# Patient Record
Sex: FEMALE | HISPANIC OR LATINO | Employment: FULL TIME | URBAN - METROPOLITAN AREA
[De-identification: names, ages, dates, MRNs, and addresses within clinical notes are randomized per-mention and may not be internally consistent; named-entity substitution may affect disease eponyms.]

---

## 2023-02-06 ENCOUNTER — HOSPITAL ENCOUNTER (EMERGENCY)
Facility: MEDICAL CENTER | Age: 58
End: 2023-02-06
Attending: EMERGENCY MEDICINE
Payer: COMMERCIAL

## 2023-02-06 ENCOUNTER — APPOINTMENT (OUTPATIENT)
Dept: RADIOLOGY | Facility: MEDICAL CENTER | Age: 58
End: 2023-02-06
Attending: EMERGENCY MEDICINE
Payer: COMMERCIAL

## 2023-02-06 VITALS
TEMPERATURE: 97.9 F | SYSTOLIC BLOOD PRESSURE: 179 MMHG | WEIGHT: 138.89 LBS | OXYGEN SATURATION: 100 % | HEIGHT: 63 IN | HEART RATE: 86 BPM | DIASTOLIC BLOOD PRESSURE: 78 MMHG | BODY MASS INDEX: 24.61 KG/M2 | RESPIRATION RATE: 16 BRPM

## 2023-02-06 DIAGNOSIS — R10.9 FLANK PAIN: ICD-10-CM

## 2023-02-06 DIAGNOSIS — R73.9 HYPERGLYCEMIA: ICD-10-CM

## 2023-02-06 LAB
ALBUMIN SERPL BCP-MCNC: 4.5 G/DL (ref 3.2–4.9)
ALBUMIN/GLOB SERPL: 1.4 G/DL
ALP SERPL-CCNC: 98 U/L (ref 30–99)
ALT SERPL-CCNC: 28 U/L (ref 2–50)
ANION GAP SERPL CALC-SCNC: 10 MMOL/L (ref 7–16)
APPEARANCE UR: CLEAR
AST SERPL-CCNC: 22 U/L (ref 12–45)
BACTERIA #/AREA URNS HPF: ABNORMAL /HPF
BASOPHILS # BLD AUTO: 1.1 % (ref 0–1.8)
BASOPHILS # BLD: 0.06 K/UL (ref 0–0.12)
BILIRUB SERPL-MCNC: 0.4 MG/DL (ref 0.1–1.5)
BILIRUB UR QL STRIP.AUTO: NEGATIVE
BUN SERPL-MCNC: 8 MG/DL (ref 8–22)
CALCIUM ALBUM COR SERPL-MCNC: 9.2 MG/DL (ref 8.5–10.5)
CALCIUM SERPL-MCNC: 9.6 MG/DL (ref 8.4–10.2)
CHLORIDE SERPL-SCNC: 104 MMOL/L (ref 96–112)
CO2 SERPL-SCNC: 24 MMOL/L (ref 20–33)
COLOR UR: YELLOW
CREAT SERPL-MCNC: 0.53 MG/DL (ref 0.5–1.4)
EOSINOPHIL # BLD AUTO: 0.06 K/UL (ref 0–0.51)
EOSINOPHIL NFR BLD: 1.1 % (ref 0–6.9)
EPI CELLS #/AREA URNS HPF: ABNORMAL /HPF
ERYTHROCYTE [DISTWIDTH] IN BLOOD BY AUTOMATED COUNT: 37.9 FL (ref 35.9–50)
GFR SERPLBLD CREATININE-BSD FMLA CKD-EPI: 106 ML/MIN/1.73 M 2
GLOBULIN SER CALC-MCNC: 3.3 G/DL (ref 1.9–3.5)
GLUCOSE SERPL-MCNC: 172 MG/DL (ref 65–99)
GLUCOSE UR STRIP.AUTO-MCNC: >=1000 MG/DL
HCT VFR BLD AUTO: 46.1 % (ref 37–47)
HGB BLD-MCNC: 15.7 G/DL (ref 12–16)
IMM GRANULOCYTES # BLD AUTO: 0.01 K/UL (ref 0–0.11)
IMM GRANULOCYTES NFR BLD AUTO: 0.2 % (ref 0–0.9)
KETONES UR STRIP.AUTO-MCNC: NEGATIVE MG/DL
LEUKOCYTE ESTERASE UR QL STRIP.AUTO: ABNORMAL
LIPASE SERPL-CCNC: 37 U/L (ref 7–58)
LYMPHOCYTES # BLD AUTO: 1.8 K/UL (ref 1–4.8)
LYMPHOCYTES NFR BLD: 31.8 % (ref 22–41)
MCH RBC QN AUTO: 29.8 PG (ref 27–33)
MCHC RBC AUTO-ENTMCNC: 34.1 G/DL (ref 33.6–35)
MCV RBC AUTO: 87.6 FL (ref 81.4–97.8)
MICRO URNS: ABNORMAL
MONOCYTES # BLD AUTO: 0.31 K/UL (ref 0–0.85)
MONOCYTES NFR BLD AUTO: 5.5 % (ref 0–13.4)
NEUTROPHILS # BLD AUTO: 3.42 K/UL (ref 2–7.15)
NEUTROPHILS NFR BLD: 60.3 % (ref 44–72)
NITRITE UR QL STRIP.AUTO: NEGATIVE
NRBC # BLD AUTO: 0 K/UL
NRBC BLD-RTO: 0 /100 WBC
PH UR STRIP.AUTO: 6 [PH] (ref 5–8)
PLATELET # BLD AUTO: 288 K/UL (ref 164–446)
PMV BLD AUTO: 8.9 FL (ref 9–12.9)
POTASSIUM SERPL-SCNC: 4 MMOL/L (ref 3.6–5.5)
PROT SERPL-MCNC: 7.8 G/DL (ref 6–8.2)
PROT UR QL STRIP: NEGATIVE MG/DL
RBC # BLD AUTO: 5.26 M/UL (ref 4.2–5.4)
RBC # URNS HPF: ABNORMAL /HPF
RBC UR QL AUTO: NEGATIVE
SODIUM SERPL-SCNC: 138 MMOL/L (ref 135–145)
SP GR UR STRIP.AUTO: <=1.005
WBC # BLD AUTO: 5.7 K/UL (ref 4.8–10.8)
WBC #/AREA URNS HPF: ABNORMAL /HPF

## 2023-02-06 PROCEDURE — 36415 COLL VENOUS BLD VENIPUNCTURE: CPT

## 2023-02-06 PROCEDURE — 87186 SC STD MICRODIL/AGAR DIL: CPT

## 2023-02-06 PROCEDURE — 87086 URINE CULTURE/COLONY COUNT: CPT

## 2023-02-06 PROCEDURE — 74176 CT ABD & PELVIS W/O CONTRAST: CPT

## 2023-02-06 PROCEDURE — 99284 EMERGENCY DEPT VISIT MOD MDM: CPT

## 2023-02-06 PROCEDURE — 85025 COMPLETE CBC W/AUTO DIFF WBC: CPT

## 2023-02-06 PROCEDURE — 99283 EMERGENCY DEPT VISIT LOW MDM: CPT

## 2023-02-06 PROCEDURE — 87077 CULTURE AEROBIC IDENTIFY: CPT

## 2023-02-06 PROCEDURE — 83690 ASSAY OF LIPASE: CPT

## 2023-02-06 PROCEDURE — 81001 URINALYSIS AUTO W/SCOPE: CPT

## 2023-02-06 PROCEDURE — 80053 COMPREHEN METABOLIC PANEL: CPT

## 2023-02-06 RX ORDER — GLYBURIDE 2.5 MG/1
2.5 TABLET ORAL
Qty: 30 TABLET | Refills: 1 | Status: SHIPPED | OUTPATIENT
Start: 2023-02-06 | End: 2023-02-17

## 2023-02-06 RX ORDER — GLYBURIDE 5 MG/1
5 TABLET ORAL ONCE
Status: DISCONTINUED | OUTPATIENT
Start: 2023-02-06 | End: 2023-02-06 | Stop reason: HOSPADM

## 2023-02-06 RX ORDER — NITROFURANTOIN 25; 75 MG/1; MG/1
100 CAPSULE ORAL ONCE
Status: DISCONTINUED | OUTPATIENT
Start: 2023-02-06 | End: 2023-02-06 | Stop reason: HOSPADM

## 2023-02-06 RX ORDER — NITROFURANTOIN 25; 75 MG/1; MG/1
100 CAPSULE ORAL 2 TIMES DAILY
Qty: 10 CAPSULE | Refills: 0 | Status: SHIPPED | OUTPATIENT
Start: 2023-02-06 | End: 2023-02-11

## 2023-02-06 ASSESSMENT — LIFESTYLE VARIABLES
TOTAL SCORE: 0
TOTAL SCORE: 0
DO YOU DRINK ALCOHOL: NO
HAVE YOU EVER FELT YOU SHOULD CUT DOWN ON YOUR DRINKING: NO
HAVE PEOPLE ANNOYED YOU BY CRITICIZING YOUR DRINKING: NO
TOTAL SCORE: 0
EVER FELT BAD OR GUILTY ABOUT YOUR DRINKING: NO
CONSUMPTION TOTAL: INCOMPLETE
EVER HAD A DRINK FIRST THING IN THE MORNING TO STEADY YOUR NERVES TO GET RID OF A HANGOVER: NO

## 2023-02-06 NOTE — ED NOTES
Med rec updated and complete, per pt   Allergies reviewed, per pt  Pt reports no prescription medications.  Pt reports no antibiotics in the last 30 days.

## 2023-02-06 NOTE — ED TRIAGE NOTES
"Chief Complaint   Patient presents with    LLQ Pain     Started \"couple of weeks ago\", denies N/V    Flank Pain     Left, c/o urinary hesitancy, denies dysuria     BP (!) 194/110   Pulse (!) 112   Temp 36.7 °C (98.1 °F) (Temporal)   Resp 16   Ht 1.6 m (5' 3\")   Wt 63 kg (138 lb 14.2 oz)   SpO2 100%   BMI 24.60 kg/m²     "

## 2023-02-06 NOTE — ED PROVIDER NOTES
"ED Provider Note    CHIEF COMPLAINT  Chief Complaint   Patient presents with    LLQ Pain     Started \"couple of weeks ago\", denies N/V    Flank Pain     Left, c/o urinary hesitancy, denies dysuria       LIMITATION TO HISTORY   Select: None    HUE Lim is a 58 y.o. female who here with left lower quadrant abdominal pain.  Intermittent sharp stabbing since Christmas.  Happened today while she was at rest lying down.  Nonradiating.  So some urinary issues such as hesitancy possibly decreased urine flow and pushing but that is necessarily with every time.  There is no C fever chills no associated hematuria.  No associated diarrhea or constipation    Patient is here because she happened to have about rest today while she was lying in bed.  She states that it now getting better.    OUTSIDE HISTORIAN(S):  Select: See above    EXTERNAL RECORDS REVIEWED  Select: Other pending see above    REVIEW OF SYSTEMS      PAST MEDICAL HISTORY  History reviewed. No pertinent past medical history.    FAMILY HISTORY  History reviewed. No pertinent family history.    SOCIAL HISTORY  Social History     Tobacco Use    Smoking status: Never    Smokeless tobacco: Never   Substance Use Topics    Alcohol use: Yes    Drug use: Not Currently     Social History     Substance and Sexual Activity   Drug Use Not Currently       SURGICAL HISTORY  History reviewed. No pertinent surgical history.    CURRENT MEDICATIONS  No current facility-administered medications for this encounter.    Current Outpatient Medications:     multivitamin Tab, Take 1 Tablet by mouth every day., Disp: , Rfl:     Cholecalciferol (D3 PO), Take 1 Capsule by mouth every day., Disp: , Rfl:     Omega-3 Fatty Acids (OMEGA 3 PO), Take 1 Capsule by mouth every day., Disp: , Rfl:     Capsaicin-Menthol (SALONPAS GEL-PATCH HOT EX), Apply 1 Patch topically 2 times a day as needed (Apply's on left side of her stomach)., Disp: , Rfl:     ALLERGIES  Allergies   Allergen " "Reactions    Penicillins Hives       PHYSICAL EXAM  VITAL SIGNS: BP (!) 182/88   Pulse 98   Temp 36.7 °C (98.1 °F) (Temporal)   Resp 16   Ht 1.6 m (5' 3\")   Wt 63 kg (138 lb 14.2 oz)   SpO2 100%   BMI 24.60 kg/m²   Reviewed and   Constitutional: Well developed, Well nourished,.  HENT: Normocephalic, atraumatic, bilateral external ears normal, No intraoral erythema, edema, exudate  Eyes: PERRLA, conjunctiva pink, no scleral icterus.   Cardiovascular: Regular rate and rhythm. No murmurs, rubs or gallops.  No dependent edema or calf tenderness  Respiratory: Lungs clear to auscultation bilaterally. No wheezes, rales, or rhonchi.  Abdominal:  Abdomen soft, non-tender, non distended. No rebound, or guarding.    Skin: No erythema, no rash. No wounds or bruising.  Genitourinary: No costovertebral angle tenderness.   Musculoskeletal: no deformities.   Neurologic: Alert & oriented x 3, cranial nerves 2-12 intact by passive exam.  Moves 4 limbs with symmetric strength.  Psychiatric: Affect normal, Judgment normal, Mood normal.     LABS Ordered and Reviewed by Me:  Results for orders placed or performed during the hospital encounter of 02/06/23   CBC WITH DIFFERENTIAL   Result Value Ref Range    WBC 5.7 4.8 - 10.8 K/uL    RBC 5.26 4.20 - 5.40 M/uL    Hemoglobin 15.7 12.0 - 16.0 g/dL    Hematocrit 46.1 37.0 - 47.0 %    MCV 87.6 81.4 - 97.8 fL    MCH 29.8 27.0 - 33.0 pg    MCHC 34.1 33.6 - 35.0 g/dL    RDW 37.9 35.9 - 50.0 fL    Platelet Count 288 164 - 446 K/uL    MPV 8.9 (L) 9.0 - 12.9 fL    Neutrophils-Polys 60.30 44.00 - 72.00 %    Lymphocytes 31.80 22.00 - 41.00 %    Monocytes 5.50 0.00 - 13.40 %    Eosinophils 1.10 0.00 - 6.90 %    Basophils 1.10 0.00 - 1.80 %    Immature Granulocytes 0.20 0.00 - 0.90 %    Nucleated RBC 0.00 /100 WBC    Neutrophils (Absolute) 3.42 2.00 - 7.15 K/uL    Lymphs (Absolute) 1.80 1.00 - 4.80 K/uL    Monos (Absolute) 0.31 0.00 - 0.85 K/uL    Eos (Absolute) 0.06 0.00 - 0.51 K/uL    Baso " (Absolute) 0.06 0.00 - 0.12 K/uL    Immature Granulocytes (abs) 0.01 0.00 - 0.11 K/uL    NRBC (Absolute) 0.00 K/uL   COMP METABOLIC PANEL   Result Value Ref Range    Sodium 138 135 - 145 mmol/L    Potassium 4.0 3.6 - 5.5 mmol/L    Chloride 104 96 - 112 mmol/L    Co2 24 20 - 33 mmol/L    Anion Gap 10.0 7.0 - 16.0    Glucose 172 (H) 65 - 99 mg/dL    Bun 8 8 - 22 mg/dL    Creatinine 0.53 0.50 - 1.40 mg/dL    Calcium 9.6 8.4 - 10.2 mg/dL    AST(SGOT) 22 12 - 45 U/L    ALT(SGPT) 28 2 - 50 U/L    Alkaline Phosphatase 98 30 - 99 U/L    Total Bilirubin 0.4 0.1 - 1.5 mg/dL    Albumin 4.5 3.2 - 4.9 g/dL    Total Protein 7.8 6.0 - 8.2 g/dL    Globulin 3.3 1.9 - 3.5 g/dL    A-G Ratio 1.4 g/dL   LIPASE   Result Value Ref Range    Lipase 37 7 - 58 U/L   URINALYSIS (UA)    Specimen: Urine   Result Value Ref Range    Color Yellow     Character Clear     Specific Gravity <=1.005 <1.035    Ph 6.0 5.0 - 8.0    Glucose >=1000 (A) Negative mg/dL    Ketones Negative Negative mg/dL    Protein Negative Negative mg/dL    Bilirubin Negative Negative    Nitrite Negative Negative    Leukocyte Esterase Trace (A) Negative    Occult Blood Negative Negative    Micro Urine Req Microscopic    URINE MICROSCOPIC (W/UA)   Result Value Ref Range    WBC 0-2 /hpf    RBC 0-2 /hpf    Bacteria Few (A) None /hpf    Epithelial Cells Few Few /hpf   CORRECTED CALCIUM   Result Value Ref Range    Correct Calcium 9.2 8.5 - 10.5 mg/dL   ESTIMATED GFR   Result Value Ref Range    GFR (CKD-EPI) 106 >60 mL/min/1.73 m 2           RADIOLOGY  CT-RENAL COLIC EVALUATION(A/P W/O)   Final Result         1.  No evidence of urolithiasis or hydronephrosis.      2.  Aortic atherosclerosis without aneurysm.            ED COURSE:    ED Observation Status? Yes; Patient placed in observation status at 12:32 PM 02/06/23     Observation plan is as follows:   CT scan  Lab work  Observation      INTERVENTIONS BY ME:  Medications   glyBURIDE (DIABETA) tablet 5 mg (has no administration in  time range)   nitrofurantoin (MACROBID) capsule 100 mg (has no administration in time range)       Response on recheck: She is feeling better would like to go home.    12:32 PM - Patient reassessed and is improved.    I have discussed management of the patient with the following physicians and sources:     Patient discharged from ED observation at 2:33PM 02/06/23  .    MEDICAL DECISION MAKING:  PROBLEMS EVALUATED THIS VISIT:  Patient has flank pain of unknown etiology kidney stone infection are listed above.  The patient has been evaluated she has been stable.  Appears to be somewhat of a chronic condition.  With her hyperglycemia and her suspicion urine we will go and treat with UTI.  Culture added.  CT scan shows no signs of pyelonephritis and she appears stable    In addition hyperglycemia this is obviously new she is never been told she is diabetic her sugars are 172 she has greater than thousand urinary ketones at this point patient will start on glyburide she would like to get started on medication here instead of waiting for to see the PCP PCP referral be also done as well.        RISK:  There is a risk of deterioration such as increasing flank pain fever chills vomiting she is return if that happens.    Diagnostic tests and prescription drugs considered including, but not limited to: Select: Patient being sent home on Macrobid and glyburide.    Escalation of care considered, and ultimately not performed: Received blood work CT scan done..     Barriers to care at this time, including but not limited to: Select: Not have a primary care doctor..      PLAN:  New Prescriptions    GLYBURIDE (DIABETA) 2.5 MG TAB    Take 1 Tablet by mouth every morning with breakfast.    NITROFURANTOIN (MACROBID) 100 MG CAP    Take 1 Capsule by mouth 2 times a day for 5 days.         Glycemia and UTI handout given    Return for increasing pain weakness or numbness    Followup:  [facility], Emergency  Dept  [address]  [place] [address]  [phone]  Go to   If symptoms worsen    Furl for PCP establishing was made    CONDITION: Stable.     FINAL IMPRESSION  1. Flank pain    2. Hyperglycemia    3.  No PCP

## 2023-02-08 LAB
BACTERIA UR CULT: ABNORMAL
BACTERIA UR CULT: ABNORMAL
SIGNIFICANT IND 70042: ABNORMAL
SITE SITE: ABNORMAL
SOURCE SOURCE: ABNORMAL

## 2023-02-14 NOTE — ED NOTES
"ED Positive Culture Follow-up/Notification Note:    Date: 2/14/23     Patient seen in the ED on 2/6/2023 for left lower quadrant abdominal pain and urinary hesitancy.  .   1. Flank pain    2. Hyperglycemia       Discharge Medication List as of 2/6/2023  2:44 PM        START taking these medications    Details   nitrofurantoin (MACROBID) 100 MG Cap Take 1 Capsule by mouth 2 times a day for 5 days., Disp-10 Capsule, R-0, Normal      glyBURIDE (DIABETA) 2.5 MG Tab Take 1 Tablet by mouth every morning with breakfast., Disp-30 Tablet, R-1, Normal             Allergies: Penicillins     Vitals:    02/06/23 1048 02/06/23 1217 02/06/23 1442   BP: (!) 194/110 (!) 182/88 (!) 179/78   Pulse: (!) 112 98 86   Resp: 16 16 16   Temp: 36.7 °C (98.1 °F) 36.7 °C (98.1 °F) 36.6 °C (97.9 °F)   TempSrc: Temporal Temporal Temporal   SpO2: 100% 100% 100%   Weight: 63 kg (138 lb 14.2 oz)     Height: 1.6 m (5' 3\")         Final cultures:   Results       Procedure Component Value Units Date/Time    URINE CULTURE(NEW) [[id number]]  (Abnormal)  (Susceptibility) Collected: 02/06/23 1239    Order Status: Completed Specimen: Urine Updated: 02/08/23 0733     Significant Indicator POS     Source UR     Site -     Culture Result -      Escherichia coli  ,000 cfu/mL      Narrative:      Indication for culture:->Unexplained new onset of Flank pain  and/or Costovertebral angle tenderness  Indication for culture:->Unexplained new onset of Flank pain    Susceptibility       Escherichia coli (1)       Antibiotic Interpretation Microscan   Method Status    Amikacin  [*]  Sensitive <=16 mcg/mL ARACELI Final    Ampicillin Resistant >16 mcg/mL ARACELI Final    Amoxicillin/CA  [*]  Sensitive <=8/4 mcg/mL ARACELI Final    Aztreonam  [*]  Sensitive <=4 mcg/mL ARACELI Final    Ceftolozane+Tazobactam  [*]  Sensitive <=2 mcg/mL ARACELI Final    Ceftriaxone Sensitive <=1 mcg/mL ARACELI Final    Ceftazidime  [*]  Sensitive <=1 mcg/mL ARACELI Final    Cefazolin Sensitive <=2 mcg/mL ARACELI Final "     Breakpoints when Cefazolin is used for therapy of infections  other than uncomplicated UTIs due to Enterobacterales are as  follows:  ARACELI and Interpretation:  <=2 S  4 I  >=8 R         Ciprofloxacin Resistant >2 mcg/mL ARACELI Final    Cefepime Sensitive <=2 mcg/mL ARACELI Final    Cefuroxime Sensitive <=4 mcg/mL ARACELI Final    Ceftazidime+Avibactam  [*]  Sensitive <=4 mcg/mL ARACELI Final    Ampicillin/sulbactam Sensitive 8/4 mcg/mL ARACELI Final    Ertapenem  [*]  Sensitive <=0.5 mcg/mL ARACELI Final    Tobramycin Sensitive <=2 mcg/mL ARACELI Final    Nitrofurantoin Sensitive <=32 mcg/mL ARACELI Final    Gentamicin Sensitive <=2 mcg/mL ARACELI Final    Imipenem  [*]  Sensitive <=1 mcg/mL ARACELI Final    Levofloxacin Resistant >4 mcg/mL ARACELI Final    Meropenem  [*]  Sensitive <=1 mcg/mL ARACELI Final    Meropenem/Vaborbactam  [*]  Sensitive <=2 mcg/mL ARACELI Final    Minocycline Sensitive <=4 mcg/mL ARACELI Final    Pip/Tazobactam Sensitive <=8 mcg/mL ARACELI Final    Trimeth/Sulfa Sensitive <=0.5/9.5 mcg/mL ARACELI Final    Tetracycline  [*]  Resistant >8 mcg/mL ARACELI Final    Tigecycline Sensitive <=2 mcg/mL ARACELI Final               [*]  Suppressed Antibiotic                           Plan:   Appropriate antibiotic therapy prescribed. No changes required based upon culture result.      Racquel Henderson, PharmD

## 2023-02-17 ENCOUNTER — OFFICE VISIT (OUTPATIENT)
Dept: MEDICAL GROUP | Facility: MEDICAL CENTER | Age: 58
End: 2023-02-17
Attending: EMERGENCY MEDICINE
Payer: COMMERCIAL

## 2023-02-17 VITALS
TEMPERATURE: 98.4 F | SYSTOLIC BLOOD PRESSURE: 142 MMHG | OXYGEN SATURATION: 100 % | HEIGHT: 61 IN | RESPIRATION RATE: 16 BRPM | HEART RATE: 105 BPM | DIASTOLIC BLOOD PRESSURE: 62 MMHG | WEIGHT: 134.48 LBS | BODY MASS INDEX: 25.39 KG/M2

## 2023-02-17 DIAGNOSIS — E11.59 TYPE 2 DIABETES MELLITUS WITH OTHER CIRCULATORY COMPLICATION, WITHOUT LONG-TERM CURRENT USE OF INSULIN (HCC): ICD-10-CM

## 2023-02-17 DIAGNOSIS — L72.0 EPIDERMAL CYST OF NECK: ICD-10-CM

## 2023-02-17 DIAGNOSIS — Z13.21 ENCOUNTER FOR VITAMIN DEFICIENCY SCREENING: ICD-10-CM

## 2023-02-17 DIAGNOSIS — Z23 IMMUNIZATION DUE: ICD-10-CM

## 2023-02-17 DIAGNOSIS — Z11.59 ENCOUNTER FOR HEPATITIS C SCREENING TEST FOR LOW RISK PATIENT: ICD-10-CM

## 2023-02-17 DIAGNOSIS — Z12.11 COLON CANCER SCREENING: ICD-10-CM

## 2023-02-17 DIAGNOSIS — I70.0 ATHEROSCLEROSIS OF AORTA (HCC): ICD-10-CM

## 2023-02-17 DIAGNOSIS — R10.32 LLQ PAIN: ICD-10-CM

## 2023-02-17 LAB
HBA1C MFR BLD: 7.1 % (ref ?–5.8)
POCT INT CON NEG: NEGATIVE
POCT INT CON POS: POSITIVE

## 2023-02-17 PROCEDURE — 99204 OFFICE O/P NEW MOD 45 MIN: CPT | Performed by: FAMILY MEDICINE

## 2023-02-17 PROCEDURE — 83036 HEMOGLOBIN GLYCOSYLATED A1C: CPT | Performed by: FAMILY MEDICINE

## 2023-02-17 RX ORDER — GLUCOSAMINE HCL/CHONDROITIN SU 500-400 MG
CAPSULE ORAL
Qty: 100 EACH | Refills: 0 | Status: SHIPPED | OUTPATIENT
Start: 2023-02-17

## 2023-02-17 RX ORDER — LANCETS 30 GAUGE
EACH MISCELLANEOUS
Qty: 100 EACH | Refills: 0 | Status: SHIPPED | OUTPATIENT
Start: 2023-02-17

## 2023-02-17 ASSESSMENT — FIBROSIS 4 INDEX: FIB4 SCORE: 0.85

## 2023-02-17 ASSESSMENT — ENCOUNTER SYMPTOMS
MYALGIAS: 0
DIZZINESS: 0
CHILLS: 0
SHORTNESS OF BREATH: 0
PALPITATIONS: 0
WEIGHT LOSS: 0
ABDOMINAL PAIN: 1
WEAKNESS: 0
COUGH: 0
DEPRESSION: 0
FEVER: 0

## 2023-02-17 ASSESSMENT — PATIENT HEALTH QUESTIONNAIRE - PHQ9: CLINICAL INTERPRETATION OF PHQ2 SCORE: 0

## 2023-02-17 NOTE — ASSESSMENT & PLAN NOTE
New diagnosis for the patient.  POCT A1c shows 7.1.  Stop the glyburide 2.5, switch over to metformin 500 mg twice daily.  Complete CMP, A1c, lipid profile, GFR, microalbumin creatinine ratio in 3 months.

## 2023-02-17 NOTE — ASSESSMENT & PLAN NOTE
Chronic, stable.  Reviewed ED notes, labs, and imaging.  Suspect more bowel involvement.  Recommended that she increase the amount of fiber that she takes daily, initiate a probiotic, trial MiraLAX daily.  Referral to gastroenterology placed, patient is also due for colonoscopy.

## 2023-02-17 NOTE — PROGRESS NOTES
Trini Lim is a pleasant 58 y.o. female here to establish care.    HPI:   Problem   Type 2 Diabetes Mellitus With Circulatory Disorder, Without Long-Term Current Use of Insulin ([facility])    Recently diagnosed with type 2 diabetes while in the emergency room on February 6.  She had a CMP which showed a blood glucose of 176.  Patient was then started on glyburide 2.5 mg every morning with breakfast.  Has never officially been diagnosed with type 2 diabetes in the past.  Positive family history on father side.     Epidermal Cyst of Neck    Had cyst to the back of her neck and drained about 10 years ago.  Return approximately 1+ years ago, now noted since a lot larger.  Would like to have it removed.      Llq Pain    Since December has been experiencing intermittent left lower quadrant discomfort.  Describes pain as sharp in nature.  Was seen in the emergency room on 02/06.  Had labs and imaging completed.  Patient was positive for UTI they also discovered diabetes.  Imaging was negative for any kidney stones, diverticulitis.  No significant changes to the pain with her stooling patterns.  Denies any constipation or diarrhea.  States that she will have multiple stools mostly in the morning.  States the pain will wake her up, and pain improves throughout the day.  She does feel that when she voids she can feel like a muscle strain to that area.  Had CT renal which came back negative for any obstruction or stones.      Atherosclerosis of Aorta ([facility])    Completed a renal CT which showed atherosclerosis of the aorta.  Patient is not on any statin medication.  Recent diagnosis of diabetes.            Current medicines (including changes today)  Current Outpatient Medications   Medication Sig Dispense Refill    Ascorbic Acid (VITAMIN C PO) Take  by mouth.      Blood Glucose Meter Kit Test blood sugar as recommended by provider. What is covered by insurance blood glucose monitoring kit. 1 Kit 0    Blood Glucose Test Strips  Use one test strip to test blood sugar once daily early morning before first meal. 100 Strip 0    Lancets Use one approved by insurance lancet to test blood sugar once daily early morning before first meal. 100 Each 0    Alcohol Swabs Wipe site with prep pad prior to injection. 100 Each 0    metFORMIN (GLUCOPHAGE) 500 MG Tab Take 1 Tablet by mouth 2 times a day with meals for 90 days. 180 Tablet 3    multivitamin Tab Take 1 Tablet by mouth every day.      Cholecalciferol (D3 PO) Take 1 Capsule by mouth every day.      Omega-3 Fatty Acids (OMEGA 3 PO) Take 1 Capsule by mouth every day.       No current facility-administered medications for this visit.       Past Medical/ Surgical History  She  has no past medical history on file.  She  has a past surgical history that includes hysterectomy radical; primary c section; and cyst excision (Left).    Social History  Social History     Tobacco Use    Smoking status: Never    Smokeless tobacco: Never   Vaping Use    Vaping Use: Never used   Substance Use Topics    Alcohol use: Yes     Comment: Occasional glass of wine    Drug use: Never     Social History     Social History Narrative    Not on file        Family History  Family History   Problem Relation Age of Onset    Cancer Father         liver    Diabetes Paternal Uncle      Family Status   Relation Name Status    Fa  (Not Specified)    PUnc  (Not Specified)         Review of Systems   Constitutional:  Negative for chills, fever, malaise/fatigue and weight loss.   Respiratory:  Negative for cough and shortness of breath.    Cardiovascular:  Negative for chest pain and palpitations.   Gastrointestinal:  Positive for abdominal pain (LLQ pain).   Genitourinary: Negative.    Musculoskeletal:  Negative for myalgias.   Skin:  Negative for rash.   Neurological:  Negative for dizziness and weakness.   Psychiatric/Behavioral:  Negative for depression.        Objective:     BP (!) 142/62 (BP Location: Right arm, Patient Position:  "Sitting, BP Cuff Size: Adult)   Pulse (!) 105   Temp 36.9 °C (98.4 °F) (Temporal)   Resp 16   Ht 1.56 m (5' 1.42\")   Wt 61 kg (134 lb 7.7 oz)   SpO2 100%  Body mass index is 25.07 kg/m².    Physical Exam  Constitutional:       General: She is not in acute distress.  HENT:      Head: Normocephalic and atraumatic.      Right Ear: Tympanic membrane and external ear normal.      Left Ear: Tympanic membrane and external ear normal.   Eyes:      General: Lids are normal.      Extraocular Movements: Extraocular movements intact.      Conjunctiva/sclera: Conjunctivae normal.      Pupils: Pupils are equal, round, and reactive to light.   Neck:      Trachea: Trachea normal.   Cardiovascular:      Rate and Rhythm: Normal rate and regular rhythm.      Heart sounds: Normal heart sounds. No murmur heard.    No friction rub. No gallop.   Pulmonary:      Effort: Pulmonary effort is normal. No accessory muscle usage.      Breath sounds: Normal breath sounds. No wheezing or rales.   Abdominal:      General: Bowel sounds are normal.      Palpations: Abdomen is soft.      Tenderness: There is no abdominal tenderness. There is no guarding or rebound. Negative signs include Ramesh's sign and McBurney's sign.   Musculoskeletal:      Cervical back: Normal range of motion and neck supple.      Right lower leg: No edema.      Left lower leg: No edema.   Lymphadenopathy:      Cervical: No cervical adenopathy.   Skin:     General: Skin is warm and dry.      Findings: No rash.   Neurological:      General: No focal deficit present.      Mental Status: She is alert and oriented to person, place, and time. Mental status is at baseline.      GCS: GCS eye subscore is 4. GCS verbal subscore is 5. GCS motor subscore is 6.      Motor: No weakness.      Gait: Gait is intact.   Psychiatric:         Attention and Perception: Attention normal.         Mood and Affect: Mood and affect normal.         Speech: Speech normal.        Imagin2023 " CT-renal:   IMPRESSION:        1.  No evidence of urolithiasis or hydronephrosis.     2.  Aortic atherosclerosis without aneurysm.    Labs:  Most recent labs from 02/06/2023 reviewed with patient.  02/17/2023 POC a1c: 7.1  Assessment and Plan:   The following treatment plan was discussed     Problem List Items Addressed This Visit       Type 2 diabetes mellitus with circulatory disorder, without long-term current use of insulin (HCC)     New diagnosis for the patient.  POCT A1c shows 7.1.  Stop the glyburide 2.5, switch over to metformin 500 mg twice daily.  Complete CMP, A1c, lipid profile, GFR, microalbumin creatinine ratio in 3 months.         Relevant Medications    Blood Glucose Meter Kit    Blood Glucose Test Strips    Lancets    Alcohol Swabs    metFORMIN (GLUCOPHAGE) 500 MG Tab    Other Relevant Orders    POCT  A1C    Comp Metabolic Panel    ESTIMATED GFR    HEMOGLOBIN A1C    Lipid Profile    MICROALBUMIN CREAT RATIO URINE    Epidermal cyst of neck     Chronic, stable.  Referral placed to dermatology for cyst removal.         Relevant Orders    Referral to Dermatology    LLQ pain     Chronic, stable.  Reviewed ED notes, labs, and imaging.  Suspect more bowel involvement.  Recommended that she increase the amount of fiber that she takes daily, initiate a probiotic, trial MiraLAX daily.  Referral to gastroenterology placed, patient is also due for colonoscopy.         Relevant Orders    Referral to Gastroenterology    Atherosclerosis of aorta (HCC)     Other Visit Diagnoses       Immunization due        Relevant Orders    INFLUENZA VACCINE QUAD INJ (PF)    Encounter for vitamin deficiency screening        Relevant Orders    VITAMIN D,25 HYDROXY (DEFICIENCY)    Colon cancer screening        Relevant Orders    Referral to Gastroenterology    Encounter for hepatitis C screening test for low risk patient        Relevant Orders    HEP C VIRUS ANTIBODY             Followup: Return in about 3 months (around  5/17/2023) for follow-up.    I have placed POCT A1C orders.  The MA is preforming POCT A1C orders under the direction of Dr. Harding    Please note that this dictation was created using voice recognition software. I have made every reasonable attempt to correct obvious errors, but I expect that there are errors of grammar and possibly content that I did not discover before finalizing the note.

## 2023-04-21 ENCOUNTER — APPOINTMENT (RX ONLY)
Dept: URBAN - METROPOLITAN AREA CLINIC 15 | Facility: CLINIC | Age: 58
Setting detail: DERMATOLOGY
End: 2023-04-21

## 2023-04-21 DIAGNOSIS — L72.0 EPIDERMAL CYST: ICD-10-CM

## 2023-04-21 PROCEDURE — 11900 INJECT SKIN LESIONS </W 7: CPT

## 2023-04-21 PROCEDURE — ? INCISION AND DRAINAGE

## 2023-04-21 PROCEDURE — ? INTRALESIONAL KENALOG

## 2023-04-21 PROCEDURE — 10060 I&D ABSCESS SIMPLE/SINGLE: CPT

## 2023-04-21 ASSESSMENT — LOCATION ZONE DERM: LOCATION ZONE: NECK

## 2023-04-21 ASSESSMENT — LOCATION DETAILED DESCRIPTION DERM: LOCATION DETAILED: LEFT SUPERIOR LATERAL NECK

## 2023-04-21 ASSESSMENT — LOCATION SIMPLE DESCRIPTION DERM: LOCATION SIMPLE: NECK

## 2023-04-21 NOTE — PROCEDURE: INTRALESIONAL KENALOG
Expiration Date For Kenalog (Optional): 08/24
How Many Mls Were Removed From The 80 Mg/Ml (5ml) Vial When Preparing The Injectable Solution?: 0
Which Kenalog Vial Was Used?: Kenalog 10 mg/ml (5 ml vial)
Concentration Of Kenalog Solution Injected (Mg/Ml): 2.5
Consent: The risks of atrophy were reviewed with the patient.
Medical Necessity Clause: This procedure was medically necessary because the lesions that were treated were:
Lot # For Kenalog (Optional): [id number]
Detail Level: Detailed
Total Volume (Ccs): 2
Validate Note Data When Using Inventory: Yes
Bill For Wasted Drug (Kenalog)?: no
Kenalog Preparation: Kenalog

## 2023-04-21 NOTE — PROCEDURE: INCISION AND DRAINAGE
Detail Level: Simple
Lesion Type: Cyst
Method: scalpel
Curette: No
Anesthesia Type: 1% Xylocaine with [id number] epinephrine and sodium bicarbonate
Anesthesia Volume In Cc: 4
Size Of Lesion In Cm (Optional But May Be Required For Some Insurances): 0
Drainage Type?: bloody and cyst-like
Wound Care: Bacitracin
Dressing: pressure dressing with telfa
Epidermal Sutures: 4-0 Ethilon
Epidermal Closure: simple interrupted
Suture Text: The incision was partially closed with
Preparation Text: The area was prepped in the usual clean fashion.
Curette Text (Optional): After the contents were expressed a curette was used to partially remove the cyst wall.
Consent was obtained and risks were reviewed including but not limited to delayed wound healing, infection, need for multiple I and D's, and pain.
Post-Care Instructions: I reviewed with the patient in detail post-care instructions. Patient should keep wound covered and call the office should any redness, pain, swelling or worsening occur.

## 2023-04-28 ENCOUNTER — APPOINTMENT (RX ONLY)
Dept: URBAN - METROPOLITAN AREA CLINIC 15 | Facility: CLINIC | Age: 58
Setting detail: DERMATOLOGY
End: 2023-04-28

## 2023-04-28 DIAGNOSIS — L72.8 OTHER FOLLICULAR CYSTS OF THE SKIN AND SUBCUTANEOUS TISSUE: ICD-10-CM

## 2023-04-28 PROCEDURE — ? POST-OP WOUND CHECK

## 2023-04-28 ASSESSMENT — LOCATION SIMPLE DESCRIPTION DERM: LOCATION SIMPLE: NECK

## 2023-04-28 ASSESSMENT — LOCATION DETAILED DESCRIPTION DERM: LOCATION DETAILED: LEFT SUPERIOR POSTERIOR NECK

## 2023-04-28 ASSESSMENT — LOCATION ZONE DERM: LOCATION ZONE: NECK

## 2023-04-28 NOTE — PROCEDURE: POST-OP WOUND CHECK
Detail Level: Detailed
Add 46741 Cpt? (Important Note: In 2017 The Use Of 87761 Is Being Tracked By Cms To Determine Future Global Period Reimbursement For Global Periods): no

## 2023-05-01 ENCOUNTER — HOSPITAL ENCOUNTER (OUTPATIENT)
Dept: LAB | Facility: MEDICAL CENTER | Age: 58
End: 2023-05-01
Attending: FAMILY MEDICINE
Payer: COMMERCIAL

## 2023-05-01 DIAGNOSIS — Z11.59 ENCOUNTER FOR HEPATITIS C SCREENING TEST FOR LOW RISK PATIENT: ICD-10-CM

## 2023-05-01 DIAGNOSIS — Z13.21 ENCOUNTER FOR VITAMIN DEFICIENCY SCREENING: ICD-10-CM

## 2023-05-01 DIAGNOSIS — E11.59 TYPE 2 DIABETES MELLITUS WITH OTHER CIRCULATORY COMPLICATION, WITHOUT LONG-TERM CURRENT USE OF INSULIN (HCC): ICD-10-CM

## 2023-05-01 LAB
25(OH)D3 SERPL-MCNC: 48 NG/ML (ref 30–100)
ALBUMIN SERPL BCP-MCNC: 4.7 G/DL (ref 3.2–4.9)
ALBUMIN/GLOB SERPL: 1.6 G/DL
ALP SERPL-CCNC: 85 U/L (ref 30–99)
ALT SERPL-CCNC: 21 U/L (ref 2–50)
ANION GAP SERPL CALC-SCNC: 7 MMOL/L (ref 7–16)
AST SERPL-CCNC: 16 U/L (ref 12–45)
BILIRUB SERPL-MCNC: 0.6 MG/DL (ref 0.1–1.5)
BUN SERPL-MCNC: 9 MG/DL (ref 8–22)
CALCIUM ALBUM COR SERPL-MCNC: 9.5 MG/DL (ref 8.5–10.5)
CALCIUM SERPL-MCNC: 10.1 MG/DL (ref 8.4–10.2)
CHLORIDE SERPL-SCNC: 106 MMOL/L (ref 96–112)
CHOLEST SERPL-MCNC: 193 MG/DL (ref 100–199)
CO2 SERPL-SCNC: 28 MMOL/L (ref 20–33)
CREAT SERPL-MCNC: 0.53 MG/DL (ref 0.5–1.4)
CREAT UR-MCNC: 17.87 MG/DL
EST. AVERAGE GLUCOSE BLD GHB EST-MCNC: 134 MG/DL
FASTING STATUS PATIENT QL REPORTED: NORMAL
GFR SERPLBLD CREATININE-BSD FMLA CKD-EPI: 106 ML/MIN/1.73 M 2
GLOBULIN SER CALC-MCNC: 3 G/DL (ref 1.9–3.5)
GLUCOSE SERPL-MCNC: 103 MG/DL (ref 65–99)
HBA1C MFR BLD: 6.3 % (ref 4–5.6)
HCV AB SER QL: NORMAL
HDLC SERPL-MCNC: 66 MG/DL
LDLC SERPL CALC-MCNC: 115 MG/DL
MICROALBUMIN UR-MCNC: <1.2 MG/DL
MICROALBUMIN/CREAT UR: NORMAL MG/G (ref 0–30)
POTASSIUM SERPL-SCNC: 4 MMOL/L (ref 3.6–5.5)
PROT SERPL-MCNC: 7.7 G/DL (ref 6–8.2)
SODIUM SERPL-SCNC: 141 MMOL/L (ref 135–145)
TRIGL SERPL-MCNC: 60 MG/DL (ref 0–149)

## 2023-05-01 PROCEDURE — 86803 HEPATITIS C AB TEST: CPT

## 2023-05-01 PROCEDURE — 82043 UR ALBUMIN QUANTITATIVE: CPT

## 2023-05-01 PROCEDURE — 82570 ASSAY OF URINE CREATININE: CPT

## 2023-05-01 PROCEDURE — 83036 HEMOGLOBIN GLYCOSYLATED A1C: CPT

## 2023-05-01 PROCEDURE — 80053 COMPREHEN METABOLIC PANEL: CPT

## 2023-05-01 PROCEDURE — 36415 COLL VENOUS BLD VENIPUNCTURE: CPT

## 2023-05-01 PROCEDURE — 80061 LIPID PANEL: CPT

## 2023-05-01 PROCEDURE — 82306 VITAMIN D 25 HYDROXY: CPT

## 2023-05-12 ENCOUNTER — OFFICE VISIT (OUTPATIENT)
Dept: MEDICAL GROUP | Facility: MEDICAL CENTER | Age: 58
End: 2023-05-12
Payer: COMMERCIAL

## 2023-05-12 VITALS
SYSTOLIC BLOOD PRESSURE: 122 MMHG | BODY MASS INDEX: 21.62 KG/M2 | HEART RATE: 76 BPM | DIASTOLIC BLOOD PRESSURE: 70 MMHG | HEIGHT: 63 IN | WEIGHT: 122 LBS | RESPIRATION RATE: 17 BRPM | TEMPERATURE: 98.3 F | OXYGEN SATURATION: 100 %

## 2023-05-12 DIAGNOSIS — E78.5 HYPERLIPIDEMIA ASSOCIATED WITH TYPE 2 DIABETES MELLITUS (HCC): ICD-10-CM

## 2023-05-12 DIAGNOSIS — Z23 IMMUNIZATION DUE: ICD-10-CM

## 2023-05-12 DIAGNOSIS — E11.69 HYPERLIPIDEMIA ASSOCIATED WITH TYPE 2 DIABETES MELLITUS (HCC): ICD-10-CM

## 2023-05-12 DIAGNOSIS — E11.59 TYPE 2 DIABETES MELLITUS WITH OTHER CIRCULATORY COMPLICATION, WITHOUT LONG-TERM CURRENT USE OF INSULIN (HCC): ICD-10-CM

## 2023-05-12 DIAGNOSIS — Z12.31 ENCOUNTER FOR SCREENING MAMMOGRAM FOR MALIGNANT NEOPLASM OF BREAST: ICD-10-CM

## 2023-05-12 PROCEDURE — 3074F SYST BP LT 130 MM HG: CPT | Performed by: FAMILY MEDICINE

## 2023-05-12 PROCEDURE — 99214 OFFICE O/P EST MOD 30 MIN: CPT | Mod: 25 | Performed by: FAMILY MEDICINE

## 2023-05-12 PROCEDURE — 90677 PCV20 VACCINE IM: CPT | Performed by: FAMILY MEDICINE

## 2023-05-12 PROCEDURE — 90471 IMMUNIZATION ADMIN: CPT | Performed by: FAMILY MEDICINE

## 2023-05-12 PROCEDURE — 3078F DIAST BP <80 MM HG: CPT | Performed by: FAMILY MEDICINE

## 2023-05-12 RX ORDER — ROSUVASTATIN CALCIUM 5 MG/1
5 TABLET, COATED ORAL EVERY EVENING
Qty: 90 TABLET | Refills: 3 | Status: SHIPPED | OUTPATIENT
Start: 2023-05-12

## 2023-05-12 ASSESSMENT — FIBROSIS 4 INDEX: FIB4 SCORE: 0.72

## 2023-05-12 NOTE — ASSESSMENT & PLAN NOTE
New diagnosis for the patient.  Due to her diabetes diagnosis and her high risk for cardiovascular disease we will go initiate statin therapy to help further bring down her LDL.  Patient will be initiated on rosuvastatin 5 mg every evening.  Patient provided with information regarding this medication.

## 2023-05-12 NOTE — ASSESSMENT & PLAN NOTE
Chronic, stable.  Continue metformin 500 mg twice daily.  Continue lifestyle modifications.  Repeat A1c and CMP in 6 months.

## 2023-05-12 NOTE — PROGRESS NOTES
Trini Lim is a pleasant 58 y.o. female here for   Chief Complaint   Patient presents with    Follow-Up     diabetes      HPI:   Problem   Hyperlipidemia Associated With Type 2 Diabetes Mellitus (Hcc)    Recent completed labs that showed elevation to her LDL.  Denies ever being on any statin therapy.  Positive history for DM.       Type 2 Diabetes Mellitus With Circulatory Disorder, Without Long-Term Current Use of Insulin (Hcc)    Diagnosed with type 2 diabetes after a visit to the emergency room February 2023.  Patient was then started on glyburide 2.5 mg every morning with breakfast.  Has never officially been diagnosed with type 2 diabetes in the past.  Positive family history on father side.    Stopped taking the glyburide, currently on metformin 500 mg twice daily.  Has been making significant changes to her diet.  Recent completed labs that showed improvement in her A1c, now at 6.4.            Current Medicines (including changes today)  Current Outpatient Medications   Medication Sig Dispense Refill    rosuvastatin (CRESTOR) 5 MG Tab Take 1 Tablet by mouth every evening. 90 Tablet 3    Ascorbic Acid (VITAMIN C PO) Take  by mouth.      Blood Glucose Meter Kit Test blood sugar as recommended by provider. What is covered by insurance blood glucose monitoring kit. 1 Kit 0    Blood Glucose Test Strips Use one test strip to test blood sugar once daily early morning before first meal. 100 Strip 0    Lancets Use one approved by insurance lancet to test blood sugar once daily early morning before first meal. 100 Each 0    Alcohol Swabs Wipe site with prep pad prior to injection. 100 Each 0    metFORMIN (GLUCOPHAGE) 500 MG Tab Take 1 Tablet by mouth 2 times a day with meals for 90 days. 180 Tablet 3    multivitamin Tab Take 1 Tablet by mouth every day.      Cholecalciferol (D3 PO) Take 1 Capsule by mouth every day.      Omega-3 Fatty Acids (OMEGA 3 PO) Take 1 Capsule by mouth every day.       No current  "facility-administered medications for this visit.     Past Medical/ Surgical History  She  has no past medical history on file.  She  has a past surgical history that includes hysterectomy radical; primary c section; and cyst excision (Left).     Objective:     /70 (BP Location: Left arm, Patient Position: Sitting, BP Cuff Size: Adult)   Pulse 76   Temp 36.8 °C (98.3 °F) (Temporal)   Resp 17   Ht 1.6 m (5' 3\")   Wt 55.3 kg (122 lb)   SpO2 100%  Body mass index is 21.61 kg/m².    Physical Exam     Monofilament testing with a 10 gram force: sensation intact: intact bilaterally  Visual Inspection: Feet without maceration, ulcers, fissures.  Pedal pulses: intact bilaterally    Imaging:  No imaging    Labs  Recent labs from 05/01/2023 reviewed with the patient.    Assessment and Plan:   The following treatment plan was discussed     Problem List Items Addressed This Visit       Type 2 diabetes mellitus with circulatory disorder, without long-term current use of insulin (HCC)     Chronic, stable.  Continue metformin 500 mg twice daily.  Continue lifestyle modifications.  Repeat A1c and CMP in 6 months.           Relevant Medications    rosuvastatin (CRESTOR) 5 MG Tab    Other Relevant Orders    Comp Metabolic Panel    HEMOGLOBIN A1C    Diabetic Monofilament LE Exam (Completed)    Hyperlipidemia associated with type 2 diabetes mellitus (HCC)     New diagnosis for the patient.  Due to her diabetes diagnosis and her high risk for cardiovascular disease we will go initiate statin therapy to help further bring down her LDL.  Patient will be initiated on rosuvastatin 5 mg every evening.  Patient provided with information regarding this medication.           Relevant Medications    rosuvastatin (CRESTOR) 5 MG Tab    Other Relevant Orders    Lipid Profile     Other Visit Diagnoses       Encounter for screening mammogram for malignant neoplasm of breast        Relevant Orders    MA-SCREENING MAMMO BILAT W/TOMOSYNTHESIS " W/CAD    Immunization due        Relevant Orders    Pneumococcal Conjugate Vaccine 20-Valent (19 yrs+) (Completed)             Followup: Return in about 6 months (around 11/12/2023), or if symptoms worsen or fail to improve, for Annual and labs.    I have placed vaccination orders.  The MA is preforming vaccination orders under the direction of Dr. Harding    Please note that this dictation was created using voice recognition software. I have made every reasonable attempt to correct obvious errors, but I expect that there are errors of grammar and possibly content that I did not discover before finalizing the note.

## 2023-05-12 NOTE — PATIENT INSTRUCTIONS
Due for Shingles vaccine and covid bivalent booster    [facility]  [address]  [place] [address]  Phone: [phone]    Call [facility] for mammo, 3D due to dense breast tissue  [phone] or [phone]

## 2023-05-12 NOTE — LETTER
[facility]  HEYDI Matute  [address]  [place] [address]  Fax: [phone]   Authorization for Release/Disclosure of   Protected Health Information   Name: ROBERTO TORO : 1965 SSN: [id number]   Address: [address] [place] [address]  [place] [address] Phone:    [phone] (home)    I authorize the entity listed below to release/disclose the PHI below to:   [facility]/HEYDI Matute and HEYDI Matute   Provider or Entity Name:     Address   City, State, Zip   Phone:      Fax:     Reason for request: continuity of care   Information to be released:    [  ] LAST COLONOSCOPY,  including any PATH REPORT and follow-up  [  ] LAST FIT/COLOGUARD RESULT [  ] LAST DEXA  [  ] LAST MAMMOGRAM  [  ] LAST PAP  [  ] LAST LABS [  ] RETINA EXAM REPORT  [  ] IMMUNIZATION RECORDS  [  ] Release all info      [  ] Check here and initial the line next to each item to release ALL health information INCLUDING  _____ Care and treatment for drug and / or alcohol abuse  _____ HIV testing, infection status, or AIDS  _____ Genetic Testing    DATES OF SERVICE OR TIME PERIOD TO BE DISCLOSED: _____________  I understand and acknowledge that:  * This Authorization may be revoked at any time by you in writing, except if your health information has already been used or disclosed.  * Your health information that will be used or disclosed as a result of you signing this authorization could be re-disclosed by the recipient. If this occurs, your re-disclosed health information may no longer be protected by State or Federal laws.  * You may refuse to sign this Authorization. Your refusal will not affect your ability to obtain treatment.  * This Authorization becomes effective upon signing and will  on (date) __________.      If no date is indicated, this Authorization will  one (1) year from the signature date.    Name: Roberto Toro  Signature: Date:   2023     PLEASE FAX REQUESTED  RECORDS BACK TO: [phone]

## 2023-07-07 ENCOUNTER — APPOINTMENT (RX ONLY)
Dept: URBAN - METROPOLITAN AREA CLINIC 15 | Facility: CLINIC | Age: 58
Setting detail: DERMATOLOGY
End: 2023-07-07

## 2023-07-07 DIAGNOSIS — L72.8 OTHER FOLLICULAR CYSTS OF THE SKIN AND SUBCUTANEOUS TISSUE: ICD-10-CM

## 2023-07-07 PROCEDURE — ? POST-OP WOUND EVALUATION

## 2023-07-07 ASSESSMENT — LOCATION ZONE DERM: LOCATION ZONE: NECK

## 2023-07-07 ASSESSMENT — LOCATION SIMPLE DESCRIPTION DERM: LOCATION SIMPLE: NECK

## 2023-07-07 ASSESSMENT — LOCATION DETAILED DESCRIPTION DERM: LOCATION DETAILED: LEFT SUPERIOR POSTERIOR NECK

## 2023-07-07 NOTE — PROCEDURE: POST-OP WOUND EVALUATION
Detail Level: Zone
Add 45706 Cpt? (Important Note: In 2017 The Use Of 39450 Is Being Tracked By Cms To Determine Future Global Period Reimbursement For Global Periods): no
Wound Evaluated By (Optional): Jennifer Maguire PA-C and Holli AVALOS MA
Wound Diameter In Cm(Optional): 0
Wound Crusting?: clean
Wound Edema?: minimal
Wound Color?: pink

## 2023-11-17 ENCOUNTER — APPOINTMENT (OUTPATIENT)
Dept: MEDICAL GROUP | Facility: MEDICAL CENTER | Age: 58
End: 2023-11-17
Payer: COMMERCIAL

## 2023-12-01 ENCOUNTER — APPOINTMENT (RX ONLY)
Dept: URBAN - METROPOLITAN AREA CLINIC 15 | Facility: CLINIC | Age: 58
Setting detail: DERMATOLOGY
End: 2023-12-01

## 2023-12-01 DIAGNOSIS — L72.8 OTHER FOLLICULAR CYSTS OF THE SKIN AND SUBCUTANEOUS TISSUE: ICD-10-CM

## 2023-12-01 PROCEDURE — 99212 OFFICE O/P EST SF 10 MIN: CPT

## 2023-12-01 PROCEDURE — ? OBSERVATION

## 2023-12-01 PROCEDURE — ? CONSULTATION EXCISION

## 2023-12-01 PROCEDURE — ? COUNSELING

## 2023-12-01 ASSESSMENT — LOCATION DETAILED DESCRIPTION DERM: LOCATION DETAILED: LEFT INFERIOR POSTERIOR NECK

## 2023-12-01 ASSESSMENT — LOCATION ZONE DERM: LOCATION ZONE: NECK

## 2023-12-01 ASSESSMENT — LOCATION SIMPLE DESCRIPTION DERM: LOCATION SIMPLE: POSTERIOR NECK

## 2024-01-12 ENCOUNTER — HOSPITAL ENCOUNTER (OUTPATIENT)
Dept: LAB | Facility: MEDICAL CENTER | Age: 59
End: 2024-01-12
Attending: FAMILY MEDICINE
Payer: COMMERCIAL

## 2024-01-12 DIAGNOSIS — E11.69 HYPERLIPIDEMIA ASSOCIATED WITH TYPE 2 DIABETES MELLITUS (HCC): ICD-10-CM

## 2024-01-12 DIAGNOSIS — E78.5 HYPERLIPIDEMIA ASSOCIATED WITH TYPE 2 DIABETES MELLITUS (HCC): ICD-10-CM

## 2024-01-12 DIAGNOSIS — E11.59 TYPE 2 DIABETES MELLITUS WITH OTHER CIRCULATORY COMPLICATION, WITHOUT LONG-TERM CURRENT USE OF INSULIN (HCC): ICD-10-CM

## 2024-01-12 LAB
ALBUMIN SERPL BCP-MCNC: 4.6 G/DL (ref 3.2–4.9)
ALBUMIN/GLOB SERPL: 1.5 G/DL
ALP SERPL-CCNC: 83 U/L (ref 30–99)
ALT SERPL-CCNC: 20 U/L (ref 2–50)
ANION GAP SERPL CALC-SCNC: 12 MMOL/L (ref 7–16)
AST SERPL-CCNC: 20 U/L (ref 12–45)
BILIRUB SERPL-MCNC: 0.6 MG/DL (ref 0.1–1.5)
BUN SERPL-MCNC: 8 MG/DL (ref 8–22)
CALCIUM ALBUM COR SERPL-MCNC: 9.4 MG/DL (ref 8.5–10.5)
CALCIUM SERPL-MCNC: 9.9 MG/DL (ref 8.4–10.2)
CHLORIDE SERPL-SCNC: 100 MMOL/L (ref 96–112)
CHOLEST SERPL-MCNC: 203 MG/DL (ref 100–199)
CO2 SERPL-SCNC: 26 MMOL/L (ref 20–33)
CREAT SERPL-MCNC: 0.63 MG/DL (ref 0.5–1.4)
EST. AVERAGE GLUCOSE BLD GHB EST-MCNC: 134 MG/DL
FASTING STATUS PATIENT QL REPORTED: NORMAL
GFR SERPLBLD CREATININE-BSD FMLA CKD-EPI: 102 ML/MIN/1.73 M 2
GLOBULIN SER CALC-MCNC: 3.1 G/DL (ref 1.9–3.5)
GLUCOSE SERPL-MCNC: 125 MG/DL (ref 65–99)
HBA1C MFR BLD: 6.3 % (ref 4–5.6)
HDLC SERPL-MCNC: 81 MG/DL
LDLC SERPL CALC-MCNC: 107 MG/DL
POTASSIUM SERPL-SCNC: 4.1 MMOL/L (ref 3.6–5.5)
PROT SERPL-MCNC: 7.7 G/DL (ref 6–8.2)
SODIUM SERPL-SCNC: 138 MMOL/L (ref 135–145)
TRIGL SERPL-MCNC: 77 MG/DL (ref 0–149)

## 2024-01-12 PROCEDURE — 36415 COLL VENOUS BLD VENIPUNCTURE: CPT

## 2024-01-12 PROCEDURE — 80061 LIPID PANEL: CPT

## 2024-01-12 PROCEDURE — 80053 COMPREHEN METABOLIC PANEL: CPT

## 2024-01-12 PROCEDURE — 83036 HEMOGLOBIN GLYCOSYLATED A1C: CPT

## 2024-01-15 ENCOUNTER — TELEPHONE (OUTPATIENT)
Dept: MEDICAL GROUP | Facility: MEDICAL CENTER | Age: 59
End: 2024-01-15
Payer: COMMERCIAL

## 2024-01-16 ENCOUNTER — TELEPHONE (OUTPATIENT)
Dept: MEDICAL GROUP | Facility: MEDICAL CENTER | Age: 59
End: 2024-01-16
Payer: COMMERCIAL

## 2024-01-16 NOTE — TELEPHONE ENCOUNTER
Phone Number Called: [phone]    Call outcome: Did not leave a detailed message. Requested patient to call back.    Message: Left message for patient to call back office at [phone].

## 2024-01-16 NOTE — TELEPHONE ENCOUNTER
Phone Number Called: [phone]    Call outcome: Did not leave a detailed message. Requested patient to call back.    Message: Left message for patient to call back office at [phone].

## 2024-01-16 NOTE — TELEPHONE ENCOUNTER
----- Message from HEYDI Matute sent at 1/15/2024  6:54 AM PST -----  Please contact the patient let her know that I have reviewed her blood test results.  Her A1c is stable from her previous that we did back in May, her kidney function liver function and basic electrolytes are all normal.  She has some improvements to her LDL as well as her HDL, HDL is her good cholesterol and her LDL is her bad cholesterol.  Her appointment is scheduled with me on the 26th of this month but if she has any questions regarding her labs lets try to make her in a sooner appointment.    Thanks,  Jocelyne

## 2024-01-26 ENCOUNTER — OFFICE VISIT (OUTPATIENT)
Dept: MEDICAL GROUP | Facility: MEDICAL CENTER | Age: 59
End: 2024-01-26
Payer: COMMERCIAL

## 2024-01-26 VITALS
HEART RATE: 85 BPM | TEMPERATURE: 97.7 F | BODY MASS INDEX: 22.32 KG/M2 | DIASTOLIC BLOOD PRESSURE: 80 MMHG | OXYGEN SATURATION: 100 % | SYSTOLIC BLOOD PRESSURE: 132 MMHG | WEIGHT: 126 LBS | HEIGHT: 63 IN

## 2024-01-26 DIAGNOSIS — E78.5 HYPERLIPIDEMIA ASSOCIATED WITH TYPE 2 DIABETES MELLITUS (HCC): Chronic | ICD-10-CM

## 2024-01-26 DIAGNOSIS — Z13.29 THYROID DISORDER SCREENING: ICD-10-CM

## 2024-01-26 DIAGNOSIS — E11.69 HYPERLIPIDEMIA ASSOCIATED WITH TYPE 2 DIABETES MELLITUS (HCC): Chronic | ICD-10-CM

## 2024-01-26 DIAGNOSIS — Z23 IMMUNIZATION DUE: ICD-10-CM

## 2024-01-26 DIAGNOSIS — E11.59 TYPE 2 DIABETES MELLITUS WITH OTHER CIRCULATORY COMPLICATION, WITHOUT LONG-TERM CURRENT USE OF INSULIN (HCC): Chronic | ICD-10-CM

## 2024-01-26 DIAGNOSIS — Z00.00 ENCOUNTER FOR PREVENTATIVE ADULT HEALTH CARE EXAMINATION: Primary | ICD-10-CM

## 2024-01-26 PROBLEM — R22.42 SKIN LUMP OF LEG, LEFT: Status: RESOLVED | Noted: 2024-01-26 | Resolved: 2024-01-26

## 2024-01-26 PROBLEM — R22.42 SKIN LUMP OF LEG, LEFT: Status: ACTIVE | Noted: 2024-01-26

## 2024-01-26 PROBLEM — L72.0 EPIDERMAL CYST OF NECK: Chronic | Status: ACTIVE | Noted: 2023-02-17

## 2024-01-26 PROBLEM — I70.0 ATHEROSCLEROSIS OF AORTA (HCC): Chronic | Status: ACTIVE | Noted: 2023-02-17

## 2024-01-26 PROCEDURE — 3079F DIAST BP 80-89 MM HG: CPT | Performed by: FAMILY MEDICINE

## 2024-01-26 PROCEDURE — 3075F SYST BP GE 130 - 139MM HG: CPT | Performed by: FAMILY MEDICINE

## 2024-01-26 PROCEDURE — 99396 PREV VISIT EST AGE 40-64: CPT | Mod: 25 | Performed by: FAMILY MEDICINE

## 2024-01-26 PROCEDURE — 90471 IMMUNIZATION ADMIN: CPT | Performed by: FAMILY MEDICINE

## 2024-01-26 PROCEDURE — 90686 IIV4 VACC NO PRSV 0.5 ML IM: CPT | Performed by: FAMILY MEDICINE

## 2024-01-26 ASSESSMENT — PATIENT HEALTH QUESTIONNAIRE - PHQ9: CLINICAL INTERPRETATION OF PHQ2 SCORE: 0

## 2024-01-26 ASSESSMENT — FIBROSIS 4 INDEX: FIB4 SCORE: 0.92

## 2024-01-26 NOTE — PROGRESS NOTES
Subjective:     CC:   Chief Complaint   Patient presents with    Annual Exam    Lab Results       HPI:   Trini Lim is a 58 y.o. female who presents for annual exam    Problem   Encounter for Preventative Adult Health Care Examination    Cancer screening  Colorectal Cancer Screenin2023, due   Lung Cancer Screening: Non-smoker  Breast Cancer Screening: Due  Hep C screenin2023    Ob-Gyn/ History:    Patient has GYN provider: no  /Para:    Last Pap Smear:  unknown. no history of abnormal pap smears.  Gyn Surgery: Hysterectomy, .  No LMP recorded. Patient is postmenopausal.  She has not utilized hormone replacement therapy.  Reports vaginal dryness  No significant bloating/fluid retention, pelvic pain, or dyspareunia. No abnormal vaginal discharge.   No breast tenderness, mass, nipple discharge or changes in size or contour.  Urinary incontinence: No    Health Maintenance  Advanced directive: N/A  Osteoporosis Screen/ DEXA: Due at 64 years old  PT/vit D for falls prevention: N/A   Cholesterol Screenin2024 non-HDL cholesterol 122  Diabetes Screenin2024 A1c 6.3  Aspirin Use: None     Diet: Doesn't eat a lot of meat, some chicken and lots of fish   Exercise: Nothing     Substance Abuse: No concerns   Safe in relationship.   Seat belts, bike helmet, gun safety discussed.  Sun protection used.  Routine Dental: Daily brushing and follows with a dentist     Immunizations  Influenza: Due   HPV series: Aged out   Tetanus: 2018   Shingles: Due   COVID: Due for booster  Pneumococcal : 2023  Hep B: Completed   Other immunizations: None     Hyperlipidemia Associated With Type 2 Diabetes Mellitus (Hcc)    Recent completed labs that showed elevation to her LDL.  Denies ever being on any statin therapy.  Positive history for DM.     Latest Reference Range & Units 23 13:16   Cholesterol,Tot 100 - 199 mg/dL 193   Triglycerides 0 - 149 mg/dL 60   HDL >=40  mg/dL 66   LDL <100 mg/dL 115 (H)   (H): Data is abnormally high     Type 2 Diabetes Mellitus With Circulatory Disorder, Without Long-Term Current Use of Insulin ([facility])    Diagnosed with type 2 diabetes after a visit to the emergency room February 2023.  Patient was then started on glyburide 2.5 mg every morning with breakfast.  Has never officially been diagnosed with type 2 diabetes in the past.  Positive family history on father side.    Stopped taking the glyburide, currently on metformin 500 mg twice daily.  Has been making significant changes to her diet.  Recent completed labs that showed improvement in her A1c, now at 6.4.     Epidermal Cyst of Neck    Had cyst to the back of her neck and drained about 10 years ago.  Return approximately 1+ years ago, now noted since a lot larger.  Would like to have it removed.      Atherosclerosis of Aorta ([facility])    Completed a renal CT which showed atherosclerosis of the aorta.  Patient is not on any statin medication.  Recent diagnosis of diabetes.     Skin Lump of Leg, Left (Resolved)        OB History   No obstetric history on file.      She  reports being sexually active and has had partner(s) who are male.    She  has a past medical history of Atherosclerosis of aorta ([facility]) (02/17/2023), Epidermal cyst of neck (02/17/2023), Hyperlipidemia associated with type 2 diabetes mellitus ([facility]) (05/12/2023), and Type 2 diabetes mellitus with circulatory disorder, without long-term current use of insulin ([facility]) (02/17/2023).  She  has a past surgical history that includes hysterectomy radical; primary c section; and cyst excision (Left).    Family History   Problem Relation Age of Onset    Cancer Father         liver    Diabetes Paternal Uncle      Social History     Tobacco Use    Smoking status: Never    Smokeless tobacco: Never   Vaping Use    Vaping Use: Never used   Substance Use Topics    Alcohol use: Yes     Comment: Occasional glass of wine    Drug use: Never       Patient  "Active Problem List    Diagnosis Date Noted    Encounter for preventative adult health care examination 01/26/2024    Hyperlipidemia associated with type 2 diabetes mellitus (HCC) 05/12/2023    Type 2 diabetes mellitus with circulatory disorder, without long-term current use of insulin (HCC) 02/17/2023    Epidermal cyst of neck 02/17/2023    LLQ pain 02/17/2023    Atherosclerosis of aorta (HCC) 02/17/2023     Current Outpatient Medications   Medication Sig Dispense Refill    metFORMIN (GLUCOPHAGE) 500 MG Tab Take 500 mg by mouth 2 times a day with meals.      rosuvastatin (CRESTOR) 5 MG Tab Take 1 Tablet by mouth every evening. 90 Tablet 3    Ascorbic Acid (VITAMIN C PO) Take  by mouth.      Blood Glucose Meter Kit Test blood sugar as recommended by provider. What is covered by insurance blood glucose monitoring kit. 1 Kit 0    Blood Glucose Test Strips Use one test strip to test blood sugar once daily early morning before first meal. 100 Strip 0    Lancets Use one approved by insurance lancet to test blood sugar once daily early morning before first meal. 100 Each 0    Alcohol Swabs Wipe site with prep pad prior to injection. 100 Each 0    multivitamin Tab Take 1 Tablet by mouth every day.      Cholecalciferol (D3 PO) Take 1 Capsule by mouth every day.      Omega-3 Fatty Acids (OMEGA 3 PO) Take 1 Capsule by mouth every day.       No current facility-administered medications for this visit.     Allergies   Allergen Reactions    Penicillins Hives         Objective:   /80   Pulse 85   Temp 36.5 °C (97.7 °F)   Ht 1.6 m (5' 3\")   Wt 57.2 kg (126 lb)   SpO2 100%   BMI 22.32 kg/m²     Wt Readings from Last 4 Encounters:   01/26/24 57.2 kg (126 lb)   05/12/23 55.3 kg (122 lb)   02/17/23 61 kg (134 lb 7.7 oz)   02/06/23 63 kg (138 lb 14.2 oz)     Physical Exam  Vitals reviewed.   Constitutional:       Appearance: Normal appearance. She is normal weight.   HENT:      Head: Normocephalic.      Right Ear: Tympanic " membrane normal.      Left Ear: Tympanic membrane normal.      Nose: Nose normal.      Mouth/Throat:      Mouth: Mucous membranes are moist.      Pharynx: Oropharynx is clear.   Eyes:      Pupils: Pupils are equal, round, and reactive to light.   Cardiovascular:      Rate and Rhythm: Normal rate and regular rhythm.      Pulses: Normal pulses.      Heart sounds: Normal heart sounds.   Pulmonary:      Effort: Pulmonary effort is normal.      Breath sounds: Normal breath sounds.   Abdominal:      General: Bowel sounds are normal.      Palpations: There is no mass.   Musculoskeletal:         General: Normal range of motion.      Cervical back: Normal range of motion and neck supple.   Skin:     General: Skin is warm and dry.      Capillary Refill: Capillary refill takes less than 2 seconds.   Neurological:      General: No focal deficit present.      Mental Status: She is alert.   Psychiatric:         Mood and Affect: Mood normal.         Behavior: Behavior normal.         Thought Content: Thought content normal.         Judgment: Judgment normal.         Imaging:  No imaging    Labs:  Reviewed labs from 01/12/2024 with the patient.    Assessment and Plan:        Problem List Items Addressed This Visit       Type 2 diabetes mellitus with circulatory disorder, without long-term current use of insulin (HCC) (Chronic)    Relevant Medications    metFORMIN (GLUCOPHAGE) 500 MG Tab    Other Relevant Orders    CBC WITH DIFFERENTIAL    Comp Metabolic Panel    ESTIMATED GFR    Lipid Profile    HEMOGLOBIN A1C    Hyperlipidemia associated with type 2 diabetes mellitus (HCC) (Chronic)    Relevant Medications    metFORMIN (GLUCOPHAGE) 500 MG Tab    Other Relevant Orders    CBC WITH DIFFERENTIAL    Lipid Profile    Encounter for preventative adult health care examination - Primary     Anticipatory guidance:  --Discussed moderation in sodium/caffeine intake, saturated fat and cholesterol, caloric balance, sufficient fresh  fruits/vegetables, fiber, iron, and 0.4-0.8mg of folate supplement per day (for females capable of pregnancy).  --Discussed brushing, flossing, and dental visits.   --Encouraged regular exercise, 150 mins a week of moderate to high intensity cardiovascular activity.   --Discussed tobacco, alcohol, or other drug use; availability of treatment for abuse.   --Discussed sexually transmitted infections, partner selection, use of condoms, avoidance of unintended pregnancy and contraceptive alternatives.  --Injury prevention: Discussed safety belts, safety helmets, smoke detector, etc.  - Discussed vaccinations but they are due for  -Discussed  breast self exam, mammography screening, diet and exercise     Health maintenance: Due for HIV screening, shingles vaccine series, influenza vaccine, mammogram, COVID variant vaccine series  Immunizations per orders  Patient counseled about skin care, diet, supplements, and exercise.  Labs per orders          Other Visit Diagnoses       Thyroid disorder screening        Relevant Orders    TSH+FREE T4    Immunization due        Relevant Orders    INFLUENZA VACCINE QUAD INJ (PF)             Follow-up: Return in about 6 months (around 7/26/2024) for Follow-up on labs.         Please note that this dictation was created using voice recognition software. I have made every reasonable attempt to correct obvious errors, but I expect that there are errors of grammar and possibly content that I did not discover before finalizing the note.

## 2024-01-26 NOTE — ASSESSMENT & PLAN NOTE
Anticipatory guidance:  --Discussed moderation in sodium/caffeine intake, saturated fat and cholesterol, caloric balance, sufficient fresh fruits/vegetables, fiber, iron, and 0.4-0.8mg of folate supplement per day (for females capable of pregnancy).  --Discussed brushing, flossing, and dental visits.   --Encouraged regular exercise, 150 mins a week of moderate to high intensity cardiovascular activity.   --Discussed tobacco, alcohol, or other drug use; availability of treatment for abuse.   --Discussed sexually transmitted infections, partner selection, use of condoms, avoidance of unintended pregnancy and contraceptive alternatives.  --Injury prevention: Discussed safety belts, safety helmets, smoke detector, etc.  - Discussed vaccinations but they are due for  -Discussed  breast self exam, mammography screening, diet and exercise     Health maintenance: Due for HIV screening, shingles vaccine series, influenza vaccine, mammogram, COVID variant vaccine series  Immunizations per orders  Patient counseled about skin care, diet, supplements, and exercise.  Labs per orders

## 2024-04-06 DIAGNOSIS — E11.59 TYPE 2 DIABETES MELLITUS WITH OTHER CIRCULATORY COMPLICATION, WITHOUT LONG-TERM CURRENT USE OF INSULIN (HCC): Chronic | ICD-10-CM

## 2024-04-06 NOTE — TELEPHONE ENCOUNTER
Received request via: Patient    Was the patient seen in the last year in this department? Yes    Does the patient have an active prescription (recently filled or refills available) for medication(s) requested? No      Does the patient have group home Plus and need 100 day supply (blood pressure, diabetes and cholesterol meds only)? Patient does not have SCP

## 2024-07-26 ENCOUNTER — APPOINTMENT (OUTPATIENT)
Dept: MEDICAL GROUP | Facility: MEDICAL CENTER | Age: 59
End: 2024-07-26
Payer: COMMERCIAL

## 2024-08-30 ENCOUNTER — TELEPHONE (OUTPATIENT)
Dept: MEDICAL GROUP | Facility: MEDICAL CENTER | Age: 59
End: 2024-08-30
Payer: COMMERCIAL

## 2024-08-30 NOTE — TELEPHONE ENCOUNTER
----- Message from Nurse Practitioner HEYDI Mendoza sent at 8/27/2024 12:16 PM PDT -----  Please contact the patient to let her know that I have reviewed her mammogram result.  Everything looks great.  We will go ahead and repeat her mammogram in 1 year.    Thanks,  Jocelyne

## 2024-09-09 ENCOUNTER — TELEPHONE (OUTPATIENT)
Dept: MEDICAL GROUP | Facility: MEDICAL CENTER | Age: 59
End: 2024-09-09
Payer: COMMERCIAL

## 2024-09-12 ENCOUNTER — TELEPHONE (OUTPATIENT)
Dept: MEDICAL GROUP | Facility: MEDICAL CENTER | Age: 59
End: 2024-09-12
Payer: COMMERCIAL

## 2024-09-20 ENCOUNTER — HOSPITAL ENCOUNTER (OUTPATIENT)
Dept: LAB | Facility: MEDICAL CENTER | Age: 59
End: 2024-09-20
Attending: FAMILY MEDICINE
Payer: COMMERCIAL

## 2024-09-20 DIAGNOSIS — E11.59 TYPE 2 DIABETES MELLITUS WITH OTHER CIRCULATORY COMPLICATION, WITHOUT LONG-TERM CURRENT USE OF INSULIN (HCC): Chronic | ICD-10-CM

## 2024-09-20 DIAGNOSIS — E78.5 HYPERLIPIDEMIA ASSOCIATED WITH TYPE 2 DIABETES MELLITUS (HCC): Chronic | ICD-10-CM

## 2024-09-20 DIAGNOSIS — E11.69 HYPERLIPIDEMIA ASSOCIATED WITH TYPE 2 DIABETES MELLITUS (HCC): Chronic | ICD-10-CM

## 2024-09-20 LAB
ALBUMIN SERPL BCP-MCNC: 4.5 G/DL (ref 3.2–4.9)
ALBUMIN/GLOB SERPL: 1.5 G/DL
ALP SERPL-CCNC: 86 U/L (ref 30–99)
ALT SERPL-CCNC: 20 U/L (ref 2–50)
ANION GAP SERPL CALC-SCNC: 11 MMOL/L (ref 7–16)
AST SERPL-CCNC: 20 U/L (ref 12–45)
BASOPHILS # BLD AUTO: 1 % (ref 0–1.8)
BASOPHILS # BLD: 0.05 K/UL (ref 0–0.12)
BILIRUB SERPL-MCNC: 0.6 MG/DL (ref 0.1–1.5)
BUN SERPL-MCNC: 9 MG/DL (ref 8–22)
CALCIUM ALBUM COR SERPL-MCNC: 9.2 MG/DL (ref 8.5–10.5)
CALCIUM SERPL-MCNC: 9.6 MG/DL (ref 8.4–10.2)
CHLORIDE SERPL-SCNC: 100 MMOL/L (ref 96–112)
CHOLEST SERPL-MCNC: 196 MG/DL (ref 100–199)
CO2 SERPL-SCNC: 25 MMOL/L (ref 20–33)
CREAT SERPL-MCNC: 0.6 MG/DL (ref 0.5–1.4)
EOSINOPHIL # BLD AUTO: 0.05 K/UL (ref 0–0.51)
EOSINOPHIL NFR BLD: 1 % (ref 0–6.9)
ERYTHROCYTE [DISTWIDTH] IN BLOOD BY AUTOMATED COUNT: 38.2 FL (ref 35.9–50)
FASTING STATUS PATIENT QL REPORTED: NORMAL
GFR SERPLBLD CREATININE-BSD FMLA CKD-EPI: 102 ML/MIN/1.73 M 2
GLOBULIN SER CALC-MCNC: 3 G/DL (ref 1.9–3.5)
GLUCOSE SERPL-MCNC: 123 MG/DL (ref 65–99)
HCT VFR BLD AUTO: 44.7 % (ref 37–47)
HDLC SERPL-MCNC: 68 MG/DL
HGB BLD-MCNC: 14.8 G/DL (ref 12–16)
IMM GRANULOCYTES # BLD AUTO: 0.01 K/UL (ref 0–0.11)
IMM GRANULOCYTES NFR BLD AUTO: 0.2 % (ref 0–0.9)
LDLC SERPL CALC-MCNC: 115 MG/DL
LYMPHOCYTES # BLD AUTO: 2.13 K/UL (ref 1–4.8)
LYMPHOCYTES NFR BLD: 41.6 % (ref 22–41)
MCH RBC QN AUTO: 29.7 PG (ref 27–33)
MCHC RBC AUTO-ENTMCNC: 33.1 G/DL (ref 32.2–35.5)
MCV RBC AUTO: 89.8 FL (ref 81.4–97.8)
MONOCYTES # BLD AUTO: 0.26 K/UL (ref 0–0.85)
MONOCYTES NFR BLD AUTO: 5.1 % (ref 0–13.4)
NEUTROPHILS # BLD AUTO: 2.62 K/UL (ref 1.82–7.42)
NEUTROPHILS NFR BLD: 51.1 % (ref 44–72)
NRBC # BLD AUTO: 0 K/UL
NRBC BLD-RTO: 0 /100 WBC (ref 0–0.2)
PLATELET # BLD AUTO: 257 K/UL (ref 164–446)
PMV BLD AUTO: 9.1 FL (ref 9–12.9)
POTASSIUM SERPL-SCNC: 4 MMOL/L (ref 3.6–5.5)
PROT SERPL-MCNC: 7.5 G/DL (ref 6–8.2)
RBC # BLD AUTO: 4.98 M/UL (ref 4.2–5.4)
SODIUM SERPL-SCNC: 136 MMOL/L (ref 135–145)
T4 FREE SERPL-MCNC: 1.56 NG/DL (ref 0.93–1.7)
TRIGL SERPL-MCNC: 66 MG/DL (ref 0–149)
TSH SERPL DL<=0.005 MIU/L-ACNC: 0.97 UIU/ML (ref 0.38–5.33)
WBC # BLD AUTO: 5.1 K/UL (ref 4.8–10.8)

## 2024-09-20 PROCEDURE — 36415 COLL VENOUS BLD VENIPUNCTURE: CPT

## 2024-09-20 PROCEDURE — 85025 COMPLETE CBC W/AUTO DIFF WBC: CPT

## 2024-09-20 PROCEDURE — 83036 HEMOGLOBIN GLYCOSYLATED A1C: CPT

## 2024-09-20 PROCEDURE — 80061 LIPID PANEL: CPT

## 2024-09-20 PROCEDURE — 84443 ASSAY THYROID STIM HORMONE: CPT

## 2024-09-20 PROCEDURE — 80053 COMPREHEN METABOLIC PANEL: CPT

## 2024-09-20 PROCEDURE — 84439 ASSAY OF FREE THYROXINE: CPT

## 2024-09-21 LAB
EST. AVERAGE GLUCOSE BLD GHB EST-MCNC: 140 MG/DL
HBA1C MFR BLD: 6.5 % (ref 4–5.6)

## 2024-09-23 ENCOUNTER — TELEPHONE (OUTPATIENT)
Dept: MEDICAL GROUP | Facility: MEDICAL CENTER | Age: 59
End: 2024-09-23
Payer: COMMERCIAL

## 2024-09-23 NOTE — TELEPHONE ENCOUNTER
----- Message from Nurse Practitioner HEYDI Mendoza sent at 9/23/2024  9:18 AM PDT -----  Please contact the patient let her know that I have reviewed her blood test results.  Blood counts are normal.  Kidney function liver function basic electrolytes look good.  Her A1c did uptake a little bit she was at 6.3% she is now 6.5%.  Her HDL/good cholesterol did drop was at 81 notes at 68.  Her LDL which is her bad cholesterol did increase was at 107 now at 115.  Thyroid is normal.  If she has any questions regarding her labs I will go over them with her on September 27.    Thanks,  Jocelyne

## 2024-09-24 ENCOUNTER — TELEPHONE (OUTPATIENT)
Dept: MEDICAL GROUP | Facility: MEDICAL CENTER | Age: 59
End: 2024-09-24
Payer: COMMERCIAL

## 2024-09-27 ENCOUNTER — APPOINTMENT (OUTPATIENT)
Dept: MEDICAL GROUP | Facility: MEDICAL CENTER | Age: 59
End: 2024-09-27
Payer: COMMERCIAL

## 2024-09-27 ENCOUNTER — HOSPITAL ENCOUNTER (OUTPATIENT)
Facility: MEDICAL CENTER | Age: 59
End: 2024-09-27
Attending: FAMILY MEDICINE
Payer: COMMERCIAL

## 2024-09-27 VITALS
HEIGHT: 63 IN | WEIGHT: 131 LBS | OXYGEN SATURATION: 99 % | TEMPERATURE: 97.3 F | BODY MASS INDEX: 23.21 KG/M2 | DIASTOLIC BLOOD PRESSURE: 78 MMHG | SYSTOLIC BLOOD PRESSURE: 128 MMHG | HEART RATE: 77 BPM

## 2024-09-27 DIAGNOSIS — Z12.31 ENCOUNTER FOR SCREENING MAMMOGRAM FOR MALIGNANT NEOPLASM OF BREAST: ICD-10-CM

## 2024-09-27 DIAGNOSIS — E78.5 HYPERLIPIDEMIA ASSOCIATED WITH TYPE 2 DIABETES MELLITUS (HCC): ICD-10-CM

## 2024-09-27 DIAGNOSIS — E11.59 TYPE 2 DIABETES MELLITUS WITH OTHER CIRCULATORY COMPLICATION, WITHOUT LONG-TERM CURRENT USE OF INSULIN (HCC): Chronic | ICD-10-CM

## 2024-09-27 DIAGNOSIS — E11.69 HYPERLIPIDEMIA ASSOCIATED WITH TYPE 2 DIABETES MELLITUS (HCC): ICD-10-CM

## 2024-09-27 DIAGNOSIS — R92.333 HETEROGENEOUSLY DENSE TISSUE OF BOTH BREASTS ON MAMMOGRAPHY: ICD-10-CM

## 2024-09-27 LAB
CREAT UR-MCNC: 26.93 MG/DL
MICROALBUMIN UR-MCNC: <1.2 MG/DL
MICROALBUMIN/CREAT UR: NORMAL MG/G (ref 0–30)

## 2024-09-27 PROCEDURE — 99214 OFFICE O/P EST MOD 30 MIN: CPT | Performed by: FAMILY MEDICINE

## 2024-09-27 PROCEDURE — 3074F SYST BP LT 130 MM HG: CPT | Performed by: FAMILY MEDICINE

## 2024-09-27 PROCEDURE — 3078F DIAST BP <80 MM HG: CPT | Performed by: FAMILY MEDICINE

## 2024-09-27 PROCEDURE — 82043 UR ALBUMIN QUANTITATIVE: CPT

## 2024-09-27 PROCEDURE — 82570 ASSAY OF URINE CREATININE: CPT

## 2024-09-27 RX ORDER — METFORMIN HCL 500 MG
500 TABLET, EXTENDED RELEASE 24 HR ORAL 2 TIMES DAILY
Qty: 90 TABLET | Refills: 3 | Status: SHIPPED | OUTPATIENT
Start: 2024-09-27

## 2024-09-27 RX ORDER — ROSUVASTATIN CALCIUM 10 MG/1
10 TABLET, COATED ORAL EVERY EVENING
Qty: 90 TABLET | Refills: 3 | Status: SHIPPED | OUTPATIENT
Start: 2024-09-27

## 2024-09-27 ASSESSMENT — FIBROSIS 4 INDEX: FIB4 SCORE: 1.04

## 2024-09-27 NOTE — PROGRESS NOTES
Verbal consent was acquired by the patient to use [employer] ambient listening note generation during this visit:  Yes      Chief complaint::Diagnoses of Hyperlipidemia associated with type 2 diabetes mellitus (HCC), Type 2 diabetes mellitus with other circulatory complication, without long-term current use of insulin (HCC), Heterogeneously dense tissue of both breasts on mammography, and Encounter for screening mammogram for malignant neoplasm of breast were pertinent to this visit.    Assessment and Plan:   The following treatment plan was discussed:     Assessment & Plan  1. Type 2 Diabetes Mellitus.  Chronic, unstable  A1c levels did increase to 6.5%.  Switched her metformin over to the 500 mg extended release twice daily, prescription sent to her preferred pharmacy.  Discussed dietary modifications to help lower sugar levels.    2. Hyperlipidemia.  Chronic, unstable.  Will increase her rosuvastatin from 5 mg to 10 mg tablet prescription sent to her preferred pharmacy.  Will recheck lipid profile and liver function test in 1 year.  Discussed diet modifications.    3. Dense Breast Tissue.  New diagnosis for the patient.  Her recent mammogram shows dense breast tissue, which can make it difficult to detect small changes. A 3D mammogram, which includes both a mammogram and an ultrasound, is recommended for next year to ensure thorough screening.    4. Health Maintenance.  She is due for a urine test today to check for any microvascular disease related to her kidneys and to assess for neuropathy. She is also due for shingles and influenza vaccines, which are recommended to be received starting from October 2024.    Follow-up  Return in 6 months for follow-up.  Trini was seen today for follow-up and lab results.    Diagnoses and all orders for this visit:    Hyperlipidemia associated with type 2 diabetes mellitus (HCC)  -     Microalbumin Creat Ratio Urine (Clinic Collect); Future  -     Diabetic Monofilament LE  Exam  -     rosuvastatin (CRESTOR) 10 MG Tab; Take 1 Tablet by mouth every evening.  -     CBC WITH DIFFERENTIAL; Future  -     Comp Metabolic Panel; Future  -     ESTIMATED GFR; Future  -     Lipid Profile; Future    Type 2 diabetes mellitus with other circulatory complication, without long-term current use of insulin (HCC)  -     metFORMIN ER (GLUCOPHAGE XR) 500 MG TABLET SR 24 HR; Take 1 Tablet by mouth 2 times a day.  -     CBC WITH DIFFERENTIAL; Future  -     Comp Metabolic Panel; Future  -     ESTIMATED GFR; Future  -     HEMOGLOBIN A1C; Future    Heterogeneously dense tissue of both breasts on mammography  -     MA-SCREENING MAMMO BILAT W/TOMOSYNTHESIS W/CAD; Future  -     US-SCREENING WHOLE BREAST BILATERAL (3D SCREENING); Future    Encounter for screening mammogram for malignant neoplasm of breast  -     MA-SCREENING MAMMO BILAT W/TOMOSYNTHESIS W/CAD; Future  -     US-SCREENING WHOLE BREAST BILATERAL (3D SCREENING); Future        Followup: Return in about 6 months (around 3/27/2025), or if symptoms worsen or fail to improve, for A1c check.    I have placed urine orders.  The MA is preforming urine orders under the direction of Dr. Jesus    Subjective/HPI:   HPI:    Trini Am-Is is a pleasant 59 y.o. female here for   Chief Complaint   Patient presents with    Follow-Up    Lab Results        History of Present Illness  The patient is a 59-year-old female who is here to follow up on her last.    She has expressed interest in maintaining her current metformin dosage, supplemented with dietary modifications. She also mentioned that she was advised to undergo a 3D mammogram during her last visit, but was unable to do so due to the facility's lack of the necessary equipment.    Current Medicines (including changes today)  Current Outpatient Medications   Medication Sig Dispense Refill    rosuvastatin (CRESTOR) 10 MG Tab Take 1 Tablet by mouth every evening. 90 Tablet 3    metFORMIN ER (GLUCOPHAGE XR)  "500 MG TABLET SR 24 HR Take 1 Tablet by mouth 2 times a day. 90 Tablet 3    Ascorbic Acid (VITAMIN C PO) Take  by mouth.      Blood Glucose Meter Kit Test blood sugar as recommended by provider. What is covered by insurance blood glucose monitoring kit. 1 Kit 0    Blood Glucose Test Strips Use one test strip to test blood sugar once daily early morning before first meal. 100 Strip 0    Lancets Use one approved by insurance lancet to test blood sugar once daily early morning before first meal. 100 Each 0    Alcohol Swabs Wipe site with prep pad prior to injection. 100 Each 0    multivitamin Tab Take 1 Tablet by mouth every day.      Cholecalciferol (D3 PO) Take 1 Capsule by mouth every day.      Omega-3 Fatty Acids (OMEGA 3 PO) Take 1 Capsule by mouth every day.       No current facility-administered medications for this visit.     Past Medical/ Surgical History  She  has a past medical history of Atherosclerosis of aorta ([facility]) (02/17/2023), Epidermal cyst of neck (02/17/2023), Hyperlipidemia associated with type 2 diabetes mellitus ([facility]) (05/12/2023), and Type 2 diabetes mellitus with circulatory disorder, without long-term current use of insulin ([facility]) (02/17/2023).  She  has a past surgical history that includes hysterectomy radical; primary c section; and cyst excision (Left).       Objective:   /78   Pulse 77   Temp 36.3 °C (97.3 °F)   Ht 1.6 m (5' 3\")   Wt 59.4 kg (131 lb)   SpO2 99%  Body mass index is 23.21 kg/m².    Physical Exam  Constitutional:       General: She is not in acute distress.  HENT:      Head: Normocephalic and atraumatic.      Right Ear: Tympanic membrane and external ear normal.      Left Ear: Tympanic membrane and external ear normal.      Nose: No nasal deformity.      Mouth/Throat:      Lips: Pink.      Mouth: Mucous membranes are moist.      Pharynx: Oropharynx is clear. Uvula midline. No posterior oropharyngeal erythema.   Eyes:      General: Lids are normal.      Extraocular " Movements: Extraocular movements intact.      Conjunctiva/sclera: Conjunctivae normal.      Pupils: Pupils are equal, round, and reactive to light.   Neck:      Trachea: Trachea normal.   Cardiovascular:      Rate and Rhythm: Normal rate and regular rhythm.      Heart sounds: Normal heart sounds. No murmur heard.     No friction rub. No gallop.   Pulmonary:      Effort: Pulmonary effort is normal. No accessory muscle usage.      Breath sounds: Normal breath sounds. No wheezing or rales.   Abdominal:      General: Bowel sounds are normal.      Palpations: Abdomen is soft.      Tenderness: There is no abdominal tenderness.   Musculoskeletal:      Cervical back: Normal range of motion and neck supple.      Right lower leg: No edema.      Left lower leg: No edema.   Lymphadenopathy:      Cervical: No cervical adenopathy.   Skin:     General: Skin is warm and dry.      Findings: No rash.   Neurological:      General: No focal deficit present.      Mental Status: She is alert and oriented to person, place, and time. Mental status is at baseline.      GCS: GCS eye subscore is 4. GCS verbal subscore is 5. GCS motor subscore is 6.      Motor: No weakness.      Gait: Gait is intact.   Psychiatric:         Attention and Perception: Attention normal.         Mood and Affect: Mood and affect normal.         Speech: Speech normal.          Monofilament testing with a 10 gram force: sensation intact: intact bilaterally  Visual Inspection: Feet without maceration, ulcers, fissures.  Pedal pulses: intact bilaterally    Lab/ Imaging Results:  Results  Laboratory Studies  TSH and T4 levels are normal. A1c is 6.5%. LDL cholesterol increased. GFR indicates no sign of kidney disease. Basic chemistry and electrolyte levels are normal. BUN and creatinine levels indicate good kidney function. Calcium levels are normal. White blood cells, red blood cells, hemoglobin, hematocrit, and platelet counts are normal. Lymphocytes slightly  elevated.    Imaging  Mammogram shows dense breast tissue, no sign of breast cancer.    Please note that this dictation was created using voice recognition software. I have made every reasonable attempt to correct obvious errors, but I expect that there are errors of grammar and possibly content that I did not discover before finalizing the note.

## 2024-12-13 ENCOUNTER — TELEPHONE (OUTPATIENT)
Dept: MEDICAL GROUP | Facility: MEDICAL CENTER | Age: 59
End: 2024-12-13
Payer: COMMERCIAL

## 2024-12-13 NOTE — TELEPHONE ENCOUNTER
----- Message from Nurse Practitioner HEYDI Mendoza sent at 9/30/2024  8:53 AM PDT -----  Please contact the patient to let her know that her microalbumin creatinine ratio levels are normal, this means she does not have any microvascular disease noted in her kidneys.    Take Care,  Jocelyne Ayala, RONNI

## 2025-03-28 ENCOUNTER — OFFICE VISIT (OUTPATIENT)
Dept: MEDICAL GROUP | Facility: MEDICAL CENTER | Age: 60
End: 2025-03-28
Payer: COMMERCIAL

## 2025-03-28 ENCOUNTER — HOSPITAL ENCOUNTER (OUTPATIENT)
Facility: MEDICAL CENTER | Age: 60
End: 2025-03-28
Attending: FAMILY MEDICINE
Payer: COMMERCIAL

## 2025-03-28 VITALS
WEIGHT: 126 LBS | TEMPERATURE: 97.6 F | HEIGHT: 61 IN | BODY MASS INDEX: 23.79 KG/M2 | SYSTOLIC BLOOD PRESSURE: 132 MMHG | HEART RATE: 82 BPM | DIASTOLIC BLOOD PRESSURE: 78 MMHG | OXYGEN SATURATION: 99 %

## 2025-03-28 DIAGNOSIS — N30.00 ACUTE CYSTITIS WITHOUT HEMATURIA: ICD-10-CM

## 2025-03-28 DIAGNOSIS — E11.59 TYPE 2 DIABETES MELLITUS WITH OTHER CIRCULATORY COMPLICATION, WITHOUT LONG-TERM CURRENT USE OF INSULIN (HCC): ICD-10-CM

## 2025-03-28 DIAGNOSIS — B35.1 ONYCHOMYCOSIS: ICD-10-CM

## 2025-03-28 PROBLEM — L60.8 NAIL DISCOLORATION: Status: ACTIVE | Noted: 2025-03-28

## 2025-03-28 LAB
APPEARANCE UR: CLEAR
BILIRUB UR STRIP-MCNC: NORMAL MG/DL
COLOR UR AUTO: YELLOW
GLUCOSE UR STRIP.AUTO-MCNC: NORMAL MG/DL
HBA1C MFR BLD: 6.2 % (ref ?–5.8)
KETONES UR STRIP.AUTO-MCNC: NORMAL MG/DL
LEUKOCYTE ESTERASE UR QL STRIP.AUTO: NORMAL
NITRITE UR QL STRIP.AUTO: NORMAL
PH UR STRIP.AUTO: 7 [PH] (ref 5–8)
POCT INT CON NEG: NEGATIVE
POCT INT CON POS: POSITIVE
PROT UR QL STRIP: NORMAL MG/DL
RBC UR QL AUTO: NORMAL
SP GR UR STRIP.AUTO: 1.01
UROBILINOGEN UR STRIP-MCNC: 0.2 MG/DL

## 2025-03-28 PROCEDURE — 87086 URINE CULTURE/COLONY COUNT: CPT

## 2025-03-28 PROCEDURE — 87186 SC STD MICRODIL/AGAR DIL: CPT

## 2025-03-28 PROCEDURE — 3075F SYST BP GE 130 - 139MM HG: CPT | Performed by: FAMILY MEDICINE

## 2025-03-28 PROCEDURE — 81002 URINALYSIS NONAUTO W/O SCOPE: CPT | Performed by: FAMILY MEDICINE

## 2025-03-28 PROCEDURE — 3078F DIAST BP <80 MM HG: CPT | Performed by: FAMILY MEDICINE

## 2025-03-28 PROCEDURE — 87077 CULTURE AEROBIC IDENTIFY: CPT

## 2025-03-28 PROCEDURE — 83036 HEMOGLOBIN GLYCOSYLATED A1C: CPT | Performed by: FAMILY MEDICINE

## 2025-03-28 PROCEDURE — 99214 OFFICE O/P EST MOD 30 MIN: CPT | Performed by: FAMILY MEDICINE

## 2025-03-28 RX ORDER — NITROFURANTOIN 25; 75 MG/1; MG/1
100 CAPSULE ORAL 2 TIMES DAILY
Qty: 10 CAPSULE | Refills: 0 | Status: SHIPPED | OUTPATIENT
Start: 2025-03-28

## 2025-03-28 ASSESSMENT — FIBROSIS 4 INDEX: FIB4 SCORE: 1.06

## 2025-03-28 NOTE — PROGRESS NOTES
Verbal consent was acquired by the patient to use "[employer] ambient listening note generation during this visit:  Yes      Chief complaint::Diagnoses of Type 2 diabetes mellitus with other circulatory complication, without long-term current use of insulin (HCC), Acute cystitis without hematuria, and Onychomycosis were pertinent to this visit.    Assessment and Plan:   The following treatment plan was discussed:     Assessment & Plan  1. Type 2 diabetes mellitus: Chronic, stable. POCT A1c 6.2%.  - Continue metformin 500 mg BID  - Balanced diet  - Regular exercise  - Blood work in September 2025 before annual exam in October 2025    2. Acute cystitis: Subacute with tingling. POCT urinalysis: glucose negative, bilirubin negative, ketone negative, SG 1010, blood negative, pH 7.0, protein negative, WBC 0-2, nitrate negative, trace.  - Prescribed Macrobid for acute cystitis without hematuria  - Urine sent for culture    3. Onychomycosis: Chronic, stable. White discoloration on left thumbnail and great toenail, similar on right side.  - Continue topical remedies  - Discussed oral antifungal treatment and potential liver impact  - Referral to dermatology for further treatment    4. Health maintenance: Due for screening mammogram and ultrasound in September 2025. Dense breast tissue can hide calcifications and malignancy signs.  - Advised to schedule an appointment before the procedure to receive alprazolam (Xanax) to take 30 minutes prior to help relax    Follow-up  - Follow up in October 2025 for annual health exam  Trini was seen today for diabetes follow-up, urinary frequency and nail problem.    Diagnoses and all orders for this visit:    Type 2 diabetes mellitus with other circulatory complication, without long-term current use of insulin (HCC)  -     POCT Hemoglobin A1C  -     POCT Retinal Eye Exam    Acute cystitis without hematuria  -     POCT Urinalysis  -     URINE CULTURE(NEW); Future  -     nitrofurantoin  (MACROBID) 100 MG Cap; Take 1 Capsule by mouth 2 times a day.    Onychomycosis  -     Referral to Dermatology        Followup: Return if symptoms worsen or fail to improve.    Subjective/HPI:   HPI:    Trini Cope is a pleasant 60 y.o. female here for   Chief Complaint   Patient presents with    Diabetes Follow-up    Urinary Frequency    Nail Problem     Discoloration         History of Present Illness  The patient is a 60-year-old individual presenting with nail discoloration, urinary frequency, and for diabetes follow-up.    They report persistent, worsening nail discoloration on both thumbs and great toes, with occasional peeling but no pain. The severity fluctuates. They have tried various over-the-counter treatments with inconsistent application and effectiveness. A dermatologist advised follow-up if the issue recurred.    They report increased urinary frequency, especially nocturnally, attributing it to inadequate water intake due to a busy work schedule. No dysuria, fevers, headaches, or nausea, but occasional tingling sensations.    They have dense breast tissue and significant anxiety during mammograms due to pain, considering ultrasound as an alternative. They are concerned about a potential cancer diagnosis.    They have not completed their lab work due to a busy schedule.    MEDICATIONS  Current: metformin    Current Medicines (including changes today)  Current Outpatient Medications   Medication Sig Dispense Refill    nitrofurantoin (MACROBID) 100 MG Cap Take 1 Capsule by mouth 2 times a day. 10 Capsule 0    rosuvastatin (CRESTOR) 10 MG Tab Take 1 Tablet by mouth every evening. 90 Tablet 3    metFORMIN ER (GLUCOPHAGE XR) 500 MG TABLET SR 24 HR Take 1 Tablet by mouth 2 times a day. 90 Tablet 3    Ascorbic Acid (VITAMIN C PO) Take  by mouth.      Blood Glucose Meter Kit Test blood sugar as recommended by provider. What is covered by insurance blood glucose monitoring kit. 1 Kit 0    Blood Glucose Test  "Strips Use one test strip to test blood sugar once daily early morning before first meal. 100 Strip 0    Lancets Use one approved by insurance lancet to test blood sugar once daily early morning before first meal. 100 Each 0    Alcohol Swabs Wipe site with prep pad prior to injection. 100 Each 0    multivitamin Tab Take 1 Tablet by mouth every day.      Cholecalciferol (D3 PO) Take 1 Capsule by mouth every day.      Omega-3 Fatty Acids (OMEGA 3 PO) Take 1 Capsule by mouth every day.       No current facility-administered medications for this visit.     Past Medical/ Surgical History  She  has a past medical history of Atherosclerosis of aorta ([facility]) (02/17/2023), Epidermal cyst of neck (02/17/2023), Hyperlipidemia associated with type 2 diabetes mellitus ([facility]) (05/12/2023), and Type 2 diabetes mellitus with circulatory disorder, without long-term current use of insulin ([facility]) (02/17/2023).  She  has a past surgical history that includes hysterectomy radical; primary c section; and cyst excision (Left).       Objective:   /78   Pulse 82   Temp 36.4 °C (97.6 °F)   Ht 1.551 m (5' 1.06\")   Wt 57.2 kg (126 lb)   SpO2 99%  Body mass index is 23.76 kg/m².    Physical Exam  Constitutional:       General: She is not in acute distress.     Appearance: She is not ill-appearing or toxic-appearing.   HENT:      Head: Normocephalic.      Right Ear: Tympanic membrane and external ear normal.      Left Ear: Tympanic membrane and external ear normal.      Nose: Nose normal. No rhinorrhea.      Mouth/Throat:      Mouth: Mucous membranes are moist.      Pharynx: Oropharynx is clear. No posterior oropharyngeal erythema.   Eyes:      General:         Right eye: No discharge.         Left eye: No discharge.      Conjunctiva/sclera: Conjunctivae normal.      Pupils: Pupils are equal, round, and reactive to light.   Cardiovascular:      Rate and Rhythm: Normal rate and regular rhythm.      Heart sounds: No murmur " heard.  Pulmonary:      Effort: Pulmonary effort is normal. No respiratory distress.      Breath sounds: Normal breath sounds. No wheezing.   Abdominal:      General: Abdomen is flat.   Musculoskeletal:         General: No swelling or tenderness.      Cervical back: Normal range of motion and neck supple.      Right lower leg: No edema.      Left lower leg: No edema.   Feet:      Left foot:      Toenail Condition: Fungal disease present.     Comments: Thick abnormal toenails noted to left big toe  Skin:     General: Skin is warm.      Comments: Left thumb with white discoloration to the nail, thick    Neurological:      General: No focal deficit present.      Mental Status: She is alert and oriented to person, place, and time. Mental status is at baseline.   Psychiatric:         Mood and Affect: Mood normal.          Lab/ Imaging Results:  Results  - Laboratory Studies:    - POCT A1c: 6.2%    - POCT urinalysis:      - Glucose: negative      - Bilirubin: negative      - Ketone: negative      - S      - Blood: negative      - pH: 7.0      - Protein: negative      - WBC: 0-2      - Nitrate: negative      - Trace    Please note that this dictation was created using voice recognition software. I have made every reasonable attempt to correct obvious errors, but I expect that there are errors of grammar and possibly content that I did not discover before finalizing the note.

## 2025-03-31 ENCOUNTER — RESULTS FOLLOW-UP (OUTPATIENT)
Dept: MEDICAL GROUP | Facility: MEDICAL CENTER | Age: 60
End: 2025-03-31
Payer: COMMERCIAL
